# Patient Record
Sex: MALE | Race: WHITE | NOT HISPANIC OR LATINO | URBAN - METROPOLITAN AREA
[De-identification: names, ages, dates, MRNs, and addresses within clinical notes are randomized per-mention and may not be internally consistent; named-entity substitution may affect disease eponyms.]

---

## 2018-04-07 ENCOUNTER — INPATIENT (INPATIENT)
Facility: HOSPITAL | Age: 68
LOS: 7 days | Discharge: HOME | DRG: 809 | End: 2018-04-15
Attending: INTERNAL MEDICINE | Admitting: INTERNAL MEDICINE
Payer: MEDICARE

## 2018-04-07 VITALS
HEART RATE: 104 BPM | RESPIRATION RATE: 18 BRPM | WEIGHT: 194.01 LBS | TEMPERATURE: 99 F | SYSTOLIC BLOOD PRESSURE: 106 MMHG | HEIGHT: 71 IN | DIASTOLIC BLOOD PRESSURE: 69 MMHG | OXYGEN SATURATION: 98 %

## 2018-04-07 DIAGNOSIS — C91.40 HAIRY CELL LEUKEMIA NOT HAVING ACHIEVED REMISSION: ICD-10-CM

## 2018-04-07 DIAGNOSIS — E53.8 DEFICIENCY OF OTHER SPECIFIED B GROUP VITAMINS: ICD-10-CM

## 2018-04-07 DIAGNOSIS — D70.9 NEUTROPENIA, UNSPECIFIED: ICD-10-CM

## 2018-04-07 DIAGNOSIS — R74.0 NONSPECIFIC ELEVATION OF LEVELS OF TRANSAMINASE AND LACTIC ACID DEHYDROGENASE [LDH]: ICD-10-CM

## 2018-04-07 DIAGNOSIS — D61.818 OTHER PANCYTOPENIA: ICD-10-CM

## 2018-04-07 LAB
ALBUMIN SERPL ELPH-MCNC: 3.9 G/DL — SIGNIFICANT CHANGE UP (ref 3.3–5)
ALP SERPL-CCNC: 82 U/L — SIGNIFICANT CHANGE UP (ref 40–120)
ALT FLD-CCNC: 53 U/L RC — HIGH (ref 10–45)
ANION GAP SERPL CALC-SCNC: 13 MMOL/L — SIGNIFICANT CHANGE UP (ref 5–17)
APPEARANCE UR: CLEAR — SIGNIFICANT CHANGE UP
APTT BLD: 29.9 SEC — SIGNIFICANT CHANGE UP (ref 27.5–37.4)
AST SERPL-CCNC: 42 U/L — HIGH (ref 10–40)
BACTERIA # UR AUTO: ABNORMAL /HPF
BASE EXCESS BLDV CALC-SCNC: 2.1 MMOL/L — HIGH (ref -2–2)
BILIRUB SERPL-MCNC: 0.8 MG/DL — SIGNIFICANT CHANGE UP (ref 0.2–1.2)
BILIRUB UR-MCNC: NEGATIVE — SIGNIFICANT CHANGE UP
BUN SERPL-MCNC: 14 MG/DL — SIGNIFICANT CHANGE UP (ref 7–23)
CA-I SERPL-SCNC: 1.09 MMOL/L — LOW (ref 1.12–1.3)
CALCIUM SERPL-MCNC: 8.9 MG/DL — SIGNIFICANT CHANGE UP (ref 8.4–10.5)
CHLORIDE BLDV-SCNC: 101 MMOL/L — SIGNIFICANT CHANGE UP (ref 96–108)
CHLORIDE SERPL-SCNC: 95 MMOL/L — LOW (ref 96–108)
CO2 BLDV-SCNC: 28 MMOL/L — SIGNIFICANT CHANGE UP (ref 22–30)
CO2 SERPL-SCNC: 25 MMOL/L — SIGNIFICANT CHANGE UP (ref 22–31)
COLOR SPEC: YELLOW — SIGNIFICANT CHANGE UP
CREAT SERPL-MCNC: 0.97 MG/DL — SIGNIFICANT CHANGE UP (ref 0.5–1.3)
DIFF PNL FLD: NEGATIVE — SIGNIFICANT CHANGE UP
GAS PNL BLDV: 130 MMOL/L — LOW (ref 136–145)
GAS PNL BLDV: SIGNIFICANT CHANGE UP
GAS PNL BLDV: SIGNIFICANT CHANGE UP
GLUCOSE BLDV-MCNC: 115 MG/DL — HIGH (ref 70–99)
GLUCOSE SERPL-MCNC: 120 MG/DL — HIGH (ref 70–99)
GLUCOSE UR QL: NEGATIVE — SIGNIFICANT CHANGE UP
HCO3 BLDV-SCNC: 27 MMOL/L — SIGNIFICANT CHANGE UP (ref 21–29)
HCT VFR BLD CALC: 27.3 % — LOW (ref 39–50)
HCT VFR BLD CALC: 29.7 % — LOW (ref 39–50)
HCT VFR BLDA CALC: 30 % — LOW (ref 39–50)
HGB BLD CALC-MCNC: 9.6 G/DL — LOW (ref 13–17)
HGB BLD-MCNC: 10.5 G/DL — LOW (ref 13–17)
HGB BLD-MCNC: 9.6 G/DL — LOW (ref 13–17)
INR BLD: 1.34 RATIO — HIGH (ref 0.88–1.16)
KETONES UR-MCNC: NEGATIVE — SIGNIFICANT CHANGE UP
LACTATE BLDV-MCNC: 1.3 MMOL/L — SIGNIFICANT CHANGE UP (ref 0.7–2)
LEUKOCYTE ESTERASE UR-ACNC: NEGATIVE — SIGNIFICANT CHANGE UP
MCHC RBC-ENTMCNC: 30.4 PG — SIGNIFICANT CHANGE UP (ref 27–34)
MCHC RBC-ENTMCNC: 30.9 PG — SIGNIFICANT CHANGE UP (ref 27–34)
MCHC RBC-ENTMCNC: 35.1 GM/DL — SIGNIFICANT CHANGE UP (ref 32–36)
MCHC RBC-ENTMCNC: 35.3 GM/DL — SIGNIFICANT CHANGE UP (ref 32–36)
MCV RBC AUTO: 86.6 FL — SIGNIFICANT CHANGE UP (ref 80–100)
MCV RBC AUTO: 87.5 FL — SIGNIFICANT CHANGE UP (ref 80–100)
NITRITE UR-MCNC: NEGATIVE — SIGNIFICANT CHANGE UP
PCO2 BLDV: 46 MMHG — SIGNIFICANT CHANGE UP (ref 35–50)
PH BLDV: 7.39 — SIGNIFICANT CHANGE UP (ref 7.35–7.45)
PH UR: 6 — SIGNIFICANT CHANGE UP (ref 5–8)
PLATELET # BLD AUTO: 147 K/UL — LOW (ref 150–400)
PLATELET # BLD AUTO: 162 K/UL — SIGNIFICANT CHANGE UP (ref 150–400)
PO2 BLDV: 20 MMHG — LOW (ref 25–45)
POTASSIUM BLDV-SCNC: 3.8 MMOL/L — SIGNIFICANT CHANGE UP (ref 3.5–5)
POTASSIUM SERPL-MCNC: 4.1 MMOL/L — SIGNIFICANT CHANGE UP (ref 3.5–5.3)
POTASSIUM SERPL-SCNC: 4.1 MMOL/L — SIGNIFICANT CHANGE UP (ref 3.5–5.3)
PROT SERPL-MCNC: 7.9 G/DL — SIGNIFICANT CHANGE UP (ref 6–8.3)
PROT UR-MCNC: 30 MG/DL
PROTHROM AB SERPL-ACNC: 14.6 SEC — HIGH (ref 9.8–12.7)
RBC # BLD: 3.15 M/UL — LOW (ref 4.2–5.8)
RBC # BLD: 3.39 M/UL — LOW (ref 4.2–5.8)
RBC # FLD: 16.6 % — HIGH (ref 10.3–14.5)
RBC # FLD: 16.8 % — HIGH (ref 10.3–14.5)
RBC CASTS # UR COMP ASSIST: ABNORMAL /HPF (ref 0–2)
SAO2 % BLDV: 25 % — LOW (ref 67–88)
SODIUM SERPL-SCNC: 133 MMOL/L — LOW (ref 135–145)
SP GR SPEC: 1.02 — SIGNIFICANT CHANGE UP (ref 1.01–1.02)
UROBILINOGEN FLD QL: 1 MG/DL
WBC # BLD: 0.4 K/UL — CRITICAL LOW (ref 3.8–10.5)
WBC # BLD: 0.4 K/UL — CRITICAL LOW (ref 3.8–10.5)
WBC # FLD AUTO: 0.4 K/UL — CRITICAL LOW (ref 3.8–10.5)
WBC # FLD AUTO: 0.4 K/UL — CRITICAL LOW (ref 3.8–10.5)
WBC UR QL: SIGNIFICANT CHANGE UP /HPF (ref 0–5)

## 2018-04-07 PROCEDURE — 99223 1ST HOSP IP/OBS HIGH 75: CPT

## 2018-04-07 PROCEDURE — 93010 ELECTROCARDIOGRAM REPORT: CPT

## 2018-04-07 PROCEDURE — 71045 X-RAY EXAM CHEST 1 VIEW: CPT | Mod: 26

## 2018-04-07 PROCEDURE — 99285 EMERGENCY DEPT VISIT HI MDM: CPT | Mod: 25

## 2018-04-07 RX ORDER — CEFEPIME 1 G/1
2000 INJECTION, POWDER, FOR SOLUTION INTRAMUSCULAR; INTRAVENOUS ONCE
Qty: 0 | Refills: 0 | Status: DISCONTINUED | OUTPATIENT
Start: 2018-04-07 | End: 2018-04-07

## 2018-04-07 RX ORDER — PREGABALIN 225 MG/1
1 CAPSULE ORAL
Qty: 0 | Refills: 0 | COMMUNITY

## 2018-04-07 RX ORDER — DOCUSATE SODIUM 100 MG
100 CAPSULE ORAL THREE TIMES A DAY
Qty: 0 | Refills: 0 | Status: DISCONTINUED | OUTPATIENT
Start: 2018-04-07 | End: 2018-04-15

## 2018-04-07 RX ORDER — ACETAMINOPHEN 500 MG
1000 TABLET ORAL ONCE
Qty: 0 | Refills: 0 | Status: COMPLETED | OUTPATIENT
Start: 2018-04-07 | End: 2018-04-07

## 2018-04-07 RX ORDER — CEFEPIME 1 G/1
2000 INJECTION, POWDER, FOR SOLUTION INTRAMUSCULAR; INTRAVENOUS ONCE
Qty: 0 | Refills: 0 | Status: COMPLETED | OUTPATIENT
Start: 2018-04-07 | End: 2018-04-07

## 2018-04-07 RX ORDER — CEFEPIME 1 G/1
2000 INJECTION, POWDER, FOR SOLUTION INTRAMUSCULAR; INTRAVENOUS EVERY 8 HOURS
Qty: 0 | Refills: 0 | Status: DISCONTINUED | OUTPATIENT
Start: 2018-04-08 | End: 2018-04-13

## 2018-04-07 RX ORDER — ACETAMINOPHEN 500 MG
650 TABLET ORAL EVERY 6 HOURS
Qty: 0 | Refills: 0 | Status: DISCONTINUED | OUTPATIENT
Start: 2018-04-07 | End: 2018-04-15

## 2018-04-07 RX ORDER — SODIUM CHLORIDE 9 MG/ML
3 INJECTION INTRAMUSCULAR; INTRAVENOUS; SUBCUTANEOUS ONCE
Qty: 0 | Refills: 0 | Status: COMPLETED | OUTPATIENT
Start: 2018-04-07 | End: 2018-04-07

## 2018-04-07 RX ORDER — ATORVASTATIN CALCIUM 80 MG/1
1 TABLET, FILM COATED ORAL
Qty: 0 | Refills: 0 | COMMUNITY

## 2018-04-07 RX ORDER — EZETIMIBE 10 MG/1
1 TABLET ORAL
Qty: 0 | Refills: 0 | COMMUNITY

## 2018-04-07 RX ORDER — ATORVASTATIN CALCIUM 80 MG/1
20 TABLET, FILM COATED ORAL
Qty: 0 | Refills: 0 | Status: DISCONTINUED | OUTPATIENT
Start: 2018-04-07 | End: 2018-04-15

## 2018-04-07 RX ORDER — SENNA PLUS 8.6 MG/1
2 TABLET ORAL AT BEDTIME
Qty: 0 | Refills: 0 | Status: DISCONTINUED | OUTPATIENT
Start: 2018-04-07 | End: 2018-04-15

## 2018-04-07 RX ORDER — ASPIRIN/CALCIUM CARB/MAGNESIUM 324 MG
81 TABLET ORAL DAILY
Qty: 0 | Refills: 0 | Status: DISCONTINUED | OUTPATIENT
Start: 2018-04-07 | End: 2018-04-15

## 2018-04-07 RX ORDER — ASPIRIN/CALCIUM CARB/MAGNESIUM 324 MG
1 TABLET ORAL
Qty: 0 | Refills: 0 | COMMUNITY

## 2018-04-07 RX ORDER — HEPARIN SODIUM 5000 [USP'U]/ML
5000 INJECTION INTRAVENOUS; SUBCUTANEOUS EVERY 8 HOURS
Qty: 0 | Refills: 0 | Status: DISCONTINUED | OUTPATIENT
Start: 2018-04-07 | End: 2018-04-07

## 2018-04-07 RX ORDER — SODIUM CHLORIDE 9 MG/ML
500 INJECTION INTRAMUSCULAR; INTRAVENOUS; SUBCUTANEOUS
Qty: 0 | Refills: 0 | Status: COMPLETED | OUTPATIENT
Start: 2018-04-07 | End: 2018-04-07

## 2018-04-07 RX ORDER — ACETAMINOPHEN 500 MG
650 TABLET ORAL EVERY 6 HOURS
Qty: 0 | Refills: 0 | Status: DISCONTINUED | OUTPATIENT
Start: 2018-04-07 | End: 2018-04-07

## 2018-04-07 RX ADMIN — SODIUM CHLORIDE 2000 MILLILITER(S): 9 INJECTION INTRAMUSCULAR; INTRAVENOUS; SUBCUTANEOUS at 19:34

## 2018-04-07 RX ADMIN — Medication 1000 MILLIGRAM(S): at 19:26

## 2018-04-07 RX ADMIN — SODIUM CHLORIDE 2000 MILLILITER(S): 9 INJECTION INTRAMUSCULAR; INTRAVENOUS; SUBCUTANEOUS at 17:33

## 2018-04-07 RX ADMIN — SODIUM CHLORIDE 2000 MILLILITER(S): 9 INJECTION INTRAMUSCULAR; INTRAVENOUS; SUBCUTANEOUS at 16:30

## 2018-04-07 RX ADMIN — SODIUM CHLORIDE 3 MILLILITER(S): 9 INJECTION INTRAMUSCULAR; INTRAVENOUS; SUBCUTANEOUS at 16:51

## 2018-04-07 RX ADMIN — SODIUM CHLORIDE 2000 MILLILITER(S): 9 INJECTION INTRAMUSCULAR; INTRAVENOUS; SUBCUTANEOUS at 17:32

## 2018-04-07 RX ADMIN — SODIUM CHLORIDE 2000 MILLILITER(S): 9 INJECTION INTRAMUSCULAR; INTRAVENOUS; SUBCUTANEOUS at 16:51

## 2018-04-07 RX ADMIN — CEFEPIME 100 MILLIGRAM(S): 1 INJECTION, POWDER, FOR SOLUTION INTRAMUSCULAR; INTRAVENOUS at 16:51

## 2018-04-07 RX ADMIN — Medication 1000 MILLIGRAM(S): at 19:56

## 2018-04-07 RX ADMIN — Medication 650 MILLIGRAM(S): at 23:01

## 2018-04-07 RX ADMIN — Medication 100 MILLIGRAM(S): at 23:01

## 2018-04-07 NOTE — ED PROVIDER NOTE - PHYSICAL EXAMINATION
GEN: NAD, awake, eyes open spontaneously  HEENT: NCAT, MMM, Trachea midline, normal conjunctiva, perrl, normal oral pharynx   CHEST/LUNGS: Non-tachypneic, CTAB, bilateral breath sounds  CARDIAC: Non-tachycardic, normal perfusion  ABDOMEN: Soft, NTND, No rebound/guarding  MSK: No edema, no gross deformity of extremities  SKIN: No rashes, no petechiae, no vesicles  NEURO: CN grossly intact, normal coordination, no focal motor or sensory deficits  PSYCH: Alert, appropriate, cooperative, with capacity and insight

## 2018-04-07 NOTE — H&P ADULT - PROBLEM SELECTOR PLAN 1
- on history and exam, no obvious source of infection  - cont cefepime  - f/u blood and urine cultures  - if persistent fevers without improvement, consider ID consult and pan CT scan  - neutropenic precautions

## 2018-04-07 NOTE — H&P ADULT - ASSESSMENT
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

## 2018-04-07 NOTE — H&P ADULT - PROBLEM SELECTOR PLAN 5
- mild transaminitis with mild coagulopathy  - cont to monitor, if worsening, will need additional workup  - no abd pain at all

## 2018-04-07 NOTE — H&P ADULT - NSHPLABSRESULTS_GEN_ALL_CORE
Personally reviewed labs.   Personally reviewed imaging.                             9.6    0.4   )-----------( 147      ( 2018 19:55 )             27.3       04-    133<L>  |  95<L>  |  14  ----------------------------<  120<H>  4.1   |  25  |  0.97    Ca    8.9      2018 16:35    TPro  7.9  /  Alb  3.9  /  TBili  0.8  /  DBili  x   /  AST  42<H>  /  ALT  53<H>  /  AlkPhos  82  -            LIVER FUNCTIONS - ( 2018 16:35 )  Alb: 3.9 g/dL / Pro: 7.9 g/dL / ALK PHOS: 82 U/L / ALT: 53 U/L RC / AST: 42 U/L / GGT: x             PT/INR - ( 2018 16:35 )   PT: 14.6 sec;   INR: 1.34 ratio         PTT - ( 2018 16:35 )  PTT:29.9 sec    Urinalysis Basic - ( 2018 17:39 )    Color: Yellow / Appearance: Clear / S.019 / pH: x  Gluc: x / Ketone: Negative  / Bili: Negative / Urobili: 1 mg/dL   Blood: x / Protein: 30 mg/dL / Nitrite: Negative   Leuk Esterase: Negative / RBC: 2-5 /HPF / WBC 2-5 /HPF   Sq Epi: x / Non Sq Epi: x / Bacteria: Few /HPF

## 2018-04-07 NOTE — H&P ADULT - NSHPOUTPATIENTPROVIDERS_GEN_ALL_CORE
Dr. Harjinder Pittman (PMD), Dr. Haroon Amato ([facility]), Dr. Krishan Arboleda (Pul[facility])

## 2018-04-07 NOTE — H&P ADULT - PROBLEM SELECTOR PLAN 2
- multi component, likely related to leukemia, chemo, and ?poss b12 def?  - will check anemia labs  - no obvious s/s of bleeding  - will likely need neupogen/neulasta by hemeonc in AM

## 2018-04-07 NOTE — ED PROVIDER NOTE - OBJECTIVE STATEMENT
67 year old male with pmhx of pneumonia, hyperlipidemia, B-12 deficiency presents with frequent and constant fever since yesterday. Had OP chemotherapy at [facility] and oncologist informed patient about low grade fever. However patient said even with Tylenol fevers are persistent.  Last temperature taken this afternoon at 101.6. Denies abdominal pain, nausea, vomiting or diarrhea. Patient does notes slight constipation. Normal bowel movements and appetite.   PMD- Harjinder Pittman 67 year old male with pmhx of pneumonia, hyperlipidemia, B-12 deficiency presents with frequent and constant fever since yesterday. Had OP chemotherapy at [facility] and oncologist informed patient about low grade fever. However patient said even with Tylenol fevers are persistent.  Last temperature taken this afternoon at 101.6. Denies abdominal pain, nausea, vomiting or diarrhea. Patient does notes slight constipation. Normal bowel movements and appetite. No urinary symptoms.   PMD- Harjinder Pittman

## 2018-04-07 NOTE — H&P ADULT - HISTORY OF PRESENT ILLNESS
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    Pt states that at the end of his chemo, he was having right ear pain, sore throat, sore mouth, and mild headache, but these symptoms have all resolved, except for his headache. Reports only having a 4 out of 10 level mild headache currently. Also reports chronic nocturia/urinary frequency/difficulty starting stream/dysuria/incomplete emptying that he's had for many years. At this time, denies chest pain, SOB, abd pain, N/V, diarrhea, URI symptoms, rashes, joint pains, vision changes, neck stiffness. Pt started having fevers yesterday, for which he was told to come into the hospital. Pt reports receiving neulasta on 3/30. Pt showed me labs from 3/30 showing Hb of 11.3, and from 4/5 showing Hb of 10.7.    VS: .5, P 104, /69, R 18, 98% RA  In the ED, received cefepime 2g, NS 2.5L, tylenol.

## 2018-04-07 NOTE — H&P ADULT - NSHPPHYSICALEXAM_GEN_ALL_CORE
PHYSICAL EXAM:   GENERAL: Alert. Not confused. No acute distress. Not thin. Not cachectic. Not obese.  HEAD:  Atraumatic. Normocephalic.  EYES: EOMI. PERRLA. Normal conjunctiva/sclera.  ENT: Neck supple. No JVD. Moist oral mucosa. Not edentulous. No thrush.  LYMPH: Normal supraclavicular/cervical lymph nodes.   CARDIAC: Not tachy, Not jatin. Not irregularly irregular. Regular rate. S1. S2. No murmur. No rub. No distant heart sounds.  LUNG/CHEST: CTAB. BS equal bilaterally. No wheezes. No rales. No rhonchi.  ABDOMEN: Soft. No tenderness. No distension. No fluid wave. Normal bowel sounds.  BACK: No midline/vertebral tenderness. No flank tenderness.  VASCULAR: +2 b/l radial or ulnar pulses. Palpable DP pulses.  EXTREMITIES:  No clubbing. No cyanosis. No edema. Moving all 4.  NEUROLOGY: A&Ox3. Non-focal exam. Cranial nerves intact. Normal speech. Sensation intact.  PSYCH: Normal behavior. Normal affect.  SKIN: No jaundice. No erythema. No rash/lesion.  Vascular Access:     ICU Vital Signs Last 24 Hrs  T(C): 37.2 (07 Apr 2018 21:21), Max: 38.1 (07 Apr 2018 16:30)  T(F): 98.9 (07 Apr 2018 21:21), Max: 100.5 (07 Apr 2018 16:30)  HR: 108 (07 Apr 2018 21:21) (88 - 108)  BP: 136/59 (07 Apr 2018 21:21) (106/69 - 140/62)  BP(mean): --  ABP: --  ABP(mean): --  RR: 18 (07 Apr 2018 21:21) (17 - 18)  SpO2: 100% (07 Apr 2018 21:21) (98% - 100%)      I&O's Summary

## 2018-04-07 NOTE — H&P ADULT - NSHPREVIEWOFSYSTEMS_GEN_ALL_CORE
REVIEW OF SYSTEMS:  CONSTITUTIONAL: No weakness. +fevers. No chills. No rigors. No weight loss. No poor appetite.  EYES: No visual changes. No eye pain.  ENT: No hearing difficulty. No vertigo. No dysphagia. No Sinusitis/rhinorrhea.  NECK: No pain. No stiffness/rigidity.  CARDIAC: No chest pain. No palpitations.  RESPIRATORY: No cough. No SOB. No hemoptysis.  GASTROINTESTINAL: No abdominal pain. No nausea. No vomiting. No hematemesis. No diarrhea. No constipation. No melena. No hematochezia.  GENITOURINARY: +dysuria. +frequency. +hesitancy. No hematuria.  NEUROLOGICAL: No numbness/tingling. No focal weakness. No incontinence. +headache.  BACK: No back pain.  EXTREMITIES: No lower extremity edema. Full ROM.  SKIN: No rashes. No itching. No other lesions.  PSYCHIATRIC: No depression. No anxiety. No SI/HI.  ALLERGIC: No lip swelling. No hives.  All other review of systems is negative unless indicated above.  Unless indicated above, unable to assess ROS 2/2

## 2018-04-07 NOTE — ED PROVIDER NOTE - MEDICAL DECISION MAKING DETAILS
Will obtain IV x 2, cbc, cmp, +/-ce, VBG with lactate at time 0, fluid bolus @ 30cc/kg initiated upon departure from the room on initial assessment within 30 minutes of arrival, urine analysis and blood cultures x 2 are obtained prior to antibiotics =>prior to administration of antibiotics goal within 3 hours of arrival, and will repeat lactate with VBG if the original is elevated and do so within 6 hours of initial lactate. Neutropenic fever with tachycardia and signs of sepsis: Will obtain IV x 2, cbc, cmp, +/-ce, VBG with lactate at time 0, fluid bolus @ 30cc/kg initiated upon departure from the room on initial assessment within 30 minutes of arrival, urine analysis and blood cultures x 2 are obtained prior to antibiotics =>prior to administration of antibiotics goal within 3 hours of arrival, and will repeat lactate with VBG if the original is elevated and do so within 6 hours of initial lactate.

## 2018-04-07 NOTE — ED PROVIDER NOTE - PROGRESS NOTE DETAILS
Family would like patient admitted to Dr. WAN Barlow who would like Dr. Davenport to take care of patient while he is not available for coverage. Will admit at this time.

## 2018-04-07 NOTE — H&P ADULT - NSHPSOCIALHISTORY_GEN_ALL_CORE
Social History:    Marital Status: ( x ) , (  ) Single, (  ) , (  ) , (  )   # of Children: 3  Lives with: (  ) alone, (  ) children, ( x ) spouse, (  ) parents, (  ) siblings, (  ) friends, (  ) other:   Occupation: retired     Substance Use/Illicit Drugs: (  ) never used vs other:   Tobacco Usage: (  ) never smoked, ( x ) former smoker, (  ) current smoker and Total Pack-Years: 18  Last Alcohol Usage/Frequency/Amount/Withdrawal/Hx of Abuse:  months ago  Foreign travel/Animal exposure:

## 2018-04-08 DIAGNOSIS — D64.9 ANEMIA, UNSPECIFIED: ICD-10-CM

## 2018-04-08 LAB
ALBUMIN SERPL ELPH-MCNC: 3.1 G/DL — LOW (ref 3.3–5)
ALP SERPL-CCNC: 72 U/L — SIGNIFICANT CHANGE UP (ref 40–120)
ALT FLD-CCNC: 43 U/L RC — SIGNIFICANT CHANGE UP (ref 10–45)
ANION GAP SERPL CALC-SCNC: 12 MMOL/L — SIGNIFICANT CHANGE UP (ref 5–17)
AST SERPL-CCNC: 19 U/L — SIGNIFICANT CHANGE UP (ref 10–40)
BILIRUB SERPL-MCNC: 0.9 MG/DL — SIGNIFICANT CHANGE UP (ref 0.2–1.2)
BLD GP AB SCN SERPL QL: NEGATIVE — SIGNIFICANT CHANGE UP
BUN SERPL-MCNC: 12 MG/DL — SIGNIFICANT CHANGE UP (ref 7–23)
CALCIUM SERPL-MCNC: 8.3 MG/DL — LOW (ref 8.4–10.5)
CHLORIDE SERPL-SCNC: 98 MMOL/L — SIGNIFICANT CHANGE UP (ref 96–108)
CHOLEST SERPL-MCNC: 85 MG/DL — SIGNIFICANT CHANGE UP (ref 10–199)
CO2 SERPL-SCNC: 23 MMOL/L — SIGNIFICANT CHANGE UP (ref 22–31)
CREAT SERPL-MCNC: 0.84 MG/DL — SIGNIFICANT CHANGE UP (ref 0.5–1.3)
CULTURE RESULTS: NO GROWTH — SIGNIFICANT CHANGE UP
FOLATE SERPL-MCNC: 16.2 NG/ML — SIGNIFICANT CHANGE UP (ref 4.8–24.2)
GLUCOSE SERPL-MCNC: 136 MG/DL — HIGH (ref 70–99)
HBA1C BLD-MCNC: 5.5 % — SIGNIFICANT CHANGE UP (ref 4–5.6)
HCT VFR BLD CALC: 26.4 % — LOW (ref 39–50)
HDLC SERPL-MCNC: 18 MG/DL — LOW (ref 40–125)
HGB BLD-MCNC: 8.9 G/DL — LOW (ref 13–17)
IRON SATN MFR SERPL: 18 UG/DL — LOW (ref 45–165)
LDH SERPL L TO P-CCNC: 134 U/L — SIGNIFICANT CHANGE UP (ref 50–242)
LIPID PNL WITH DIRECT LDL SERPL: 55 MG/DL — SIGNIFICANT CHANGE UP
MAGNESIUM SERPL-MCNC: 1.9 MG/DL — SIGNIFICANT CHANGE UP (ref 1.6–2.6)
MCHC RBC-ENTMCNC: 29.4 PG — SIGNIFICANT CHANGE UP (ref 27–34)
MCHC RBC-ENTMCNC: 33.8 GM/DL — SIGNIFICANT CHANGE UP (ref 32–36)
MCV RBC AUTO: 87 FL — SIGNIFICANT CHANGE UP (ref 80–100)
PHOSPHATE SERPL-MCNC: 2.5 MG/DL — SIGNIFICANT CHANGE UP (ref 2.5–4.5)
PLATELET # BLD AUTO: 157 K/UL — SIGNIFICANT CHANGE UP (ref 150–400)
POTASSIUM SERPL-MCNC: 3.8 MMOL/L — SIGNIFICANT CHANGE UP (ref 3.5–5.3)
POTASSIUM SERPL-SCNC: 3.8 MMOL/L — SIGNIFICANT CHANGE UP (ref 3.5–5.3)
PROT SERPL-MCNC: 7 G/DL — SIGNIFICANT CHANGE UP (ref 6–8.3)
RAPID RVP RESULT: SIGNIFICANT CHANGE UP
RBC # BLD: 3.04 M/UL — LOW (ref 4.2–5.8)
RBC # BLD: 3.04 M/UL — LOW (ref 4.2–5.8)
RBC # FLD: 16.5 % — HIGH (ref 10.3–14.5)
RETICS #: 15.5 K/UL — LOW (ref 25–125)
RETICS/RBC NFR: 0.5 % — SIGNIFICANT CHANGE UP (ref 0.5–2.5)
RH IG SCN BLD-IMP: POSITIVE — SIGNIFICANT CHANGE UP
SODIUM SERPL-SCNC: 133 MMOL/L — LOW (ref 135–145)
SPECIMEN SOURCE: SIGNIFICANT CHANGE UP
TOTAL CHOLESTEROL/HDL RATIO MEASUREMENT: 4.7 RATIO — SIGNIFICANT CHANGE UP (ref 3.4–9.6)
TRIGL SERPL-MCNC: 62 MG/DL — SIGNIFICANT CHANGE UP (ref 10–149)
TSH SERPL-MCNC: 1.4 UIU/ML — SIGNIFICANT CHANGE UP (ref 0.27–4.2)
URATE SERPL-MCNC: 4.1 MG/DL — SIGNIFICANT CHANGE UP (ref 3.4–8.8)
VIT B12 SERPL-MCNC: 1983 PG/ML — HIGH (ref 232–1245)
WBC # BLD: 0.4 K/UL — CRITICAL LOW (ref 3.8–10.5)
WBC # FLD AUTO: 0.4 K/UL — CRITICAL LOW (ref 3.8–10.5)

## 2018-04-08 RX ADMIN — SENNA PLUS 2 TABLET(S): 8.6 TABLET ORAL at 21:28

## 2018-04-08 RX ADMIN — Medication 81 MILLIGRAM(S): at 11:07

## 2018-04-08 RX ADMIN — Medication 100 MILLIGRAM(S): at 21:28

## 2018-04-08 RX ADMIN — CEFEPIME 100 MILLIGRAM(S): 1 INJECTION, POWDER, FOR SOLUTION INTRAMUSCULAR; INTRAVENOUS at 21:28

## 2018-04-08 RX ADMIN — Medication 650 MILLIGRAM(S): at 19:45

## 2018-04-08 RX ADMIN — Medication 100 MILLIGRAM(S): at 13:45

## 2018-04-08 RX ADMIN — CEFEPIME 100 MILLIGRAM(S): 1 INJECTION, POWDER, FOR SOLUTION INTRAMUSCULAR; INTRAVENOUS at 05:49

## 2018-04-08 RX ADMIN — Medication 100 MILLIGRAM(S): at 05:49

## 2018-04-08 RX ADMIN — Medication 650 MILLIGRAM(S): at 05:49

## 2018-04-08 RX ADMIN — CEFEPIME 100 MILLIGRAM(S): 1 INJECTION, POWDER, FOR SOLUTION INTRAMUSCULAR; INTRAVENOUS at 13:55

## 2018-04-08 NOTE — CONSULT NOTE ADULT - PROBLEM SELECTOR RECOMMENDATION 9
---s/p Neulasta  --- started antibiotics  ----await results of blood cultures  --- 30 min discussion with pt and family re: neutropenia from chemotherapy, length of treatment and expectations for recovery

## 2018-04-08 NOTE — CONSULT NOTE ADULT - SUBJECTIVE AND OBJECTIVE BOX
Patient is a 67y old  Male who presents with a chief complaint of fevers (07 Apr 2018 21:24)      HPI:  67M c hx hairy cell leukemia, s/p Cladribine chemotherapy (last dose 3/30), Neulasta on 4/2; pneumonia (Feb '18) with outpt CT chest (3/19/18) reportedly showing resolution, who complained of 3 days of fevers up to 101.6, chills.     Pt developed right ear pain, sore throat, sore mouth, and mild headache, approximately 1 week ago. Throat culture was done which was reportedly negative. Symptoms have abated somewhat, except for his headache. Reports only having a 4 out of 10 level mild headache currently. . At this time, denies chest pain, SOB, abd pain, N/V, diarrhea, URI symptoms, rashes, joint pains, vision changes, neck stiffness.   VS: .5, P 104, /69, R 18, 98% RA    In the ED, received cefepime 2g, NS 2.5L, tylenol. (07 Apr 2018 21:24)         PAST MEDICAL & SURGICAL HISTORY:  B12 deficiency  Hairy cell leukemia not having achieved remission  No significant past surgical history      SOCIAL HISTORY: no smoking, no ETOH    FAMILY HISTORY:  No pertinent family history in first degree relatives      MEDICATIONS  (STANDING):  aspirin enteric coated 81 milliGRAM(s) Oral daily  atorvastatin 20 milliGRAM(s) Oral <User Schedule>  cefepime  IVPB 2000 milliGRAM(s) IV Intermittent every 8 hours  docusate sodium 100 milliGRAM(s) Oral three times a day    MEDICATIONS  (PRN):  acetaminophen   Tablet 650 milliGRAM(s) Oral every 6 hours PRN For Temp greater than 38 C (100.4 F) or MILD pain  senna 2 Tablet(s) Oral at bedtime PRN Constipation      Allergies    No Known Allergies    Intolerances        Vital Signs Last 24 Hrs  T(C): 36.8 (08 Apr 2018 09:03), Max: 38.1 (07 Apr 2018 16:30)  T(F): 98.2 (08 Apr 2018 09:03), Max: 100.5 (07 Apr 2018 16:30)  HR: 83 (08 Apr 2018 09:03) (83 - 108)  BP: 119/62 (08 Apr 2018 09:03) (106/69 - 143/63)  BP(mean): --  RR: 18 (08 Apr 2018 09:03) (17 - 18)  SpO2: 99% (08 Apr 2018 09:03) (97% - 100%)    REVIEW OF SYSTEMS:    CONSTITUTIONAL: No weakness, fevers or chills  EYES/ENT: No visual changes;  No vertigo or throat pain   NECK: No pain or stiffness  RESPIRATORY: No cough, wheezing, hemoptysis; No shortness of breath  CARDIOVASCULAR: No chest pain or palpitations  GASTROINTESTINAL: No abdominal or epigastric pain. No nausea, vomiting, or hematemesis; No diarrhea or constipation. No melena or hematochezia.  GENITOURINARY: No dysuria, frequency or hematuria  NEUROLOGICAL: No numbness or weakness  SKIN: No itching, burning, rashes, or lesions     PHYSICAL EXAM  General: adult in NAD  HEENT: mildly erythematous oropharynx, no exudates, anicteric sclera, pink conjunctiva  Neck: supple  CV: normal S1/S2 with no murmur rubs or gallops  Lungs: positive air movement b/l ant lungs,clear to auscultation, no wheezes, no rales  Abdomen: soft non-tender non-distended, no hepatosplenomegaly  Ext: no clubbing cyanosis or edema  Skin: no rashes and no petechiae  Neuro: alert and oriented X 4, no focal deficits      LABS:                        8.9    0.4   )-----------( 157      ( 08 Apr 2018 07:15 )             26.4         Mean Cell Volume : 87.0 fl  Mean Cell Hemoglobin : 29.4 pg  Mean Cell Hemoglobin Concentration : 33.8 gm/dL      04-08    133<L>  |  98  |  12  ----------------------------<  136<H>  3.8   |  23  |  0.84    Ca    8.3<L>      08 Apr 2018 07:15  Phos  2.5     04-08  Mg     1.9     04-08    TPro  7.0  /  Alb  3.1<L>  /  TBili  0.9  /  DBili  x   /  AST  19  /  ALT  43  /  AlkPhos  72  04-08      PT/INR - ( 07 Apr 2018 16:35 )   PT: 14.6 sec;   INR: 1.34 ratio         PTT - ( 07 Apr 2018 16:35 )  PTT:29.9 sec      BLOOD SMEAR INTERPRETATION:       RADIOLOGY & ADDITIONAL STUDIES:

## 2018-04-08 NOTE — CONSULT NOTE ADULT - ASSESSMENT
HCL on clatribine with neutropenic fever.  S/P outpatient pneumonia in February with reported radiographic and clinical resolution.  Fever with mild URI symptoms which are improving.He reports OPD throat culture was negative.  His exam is non focal.  Suggest:  1.cefepime 2 gr q 8  2.Blood cultures are pending  3.RVP  4.monitor fever and exam, additional w/u as indicated.  5.ID issues reviewed with patient and wife at bedside

## 2018-04-08 NOTE — CONSULT NOTE ADULT - SUBJECTIVE AND OBJECTIVE BOX
HPI:   Patient is a 67y male with a past history of  HCL diagnosed in 2017,being treated with cladribine, last dosed 3/30, who was admitted with fevers to 101 x 48 hours.He has noted a sore throat, nasal congsetion and ear pain post chemo, this has nearly resolved.He noted low grade fever starting on , it has persisted and slightly escalated, he came to ER  and was strted on cefepime.He was born in [facility], came to [place] in .He has no TB history.He was treated as OPD with ? augmentin in Feb for a pneumonia, recent chest CT reportedly showed resolution.He has no recent travel, no sick contacts.He denies any cough or chest symptoms, no GI or  symptoms.He c/o mild headache with the fever.    REVIEW OF SYSTEMS:  All other review of systems negative (Comprehensive ROS)    PAST MEDICAL & SURGICAL HISTORY:  B12 deficiency  Hairy cell leukemia not having achieved remission  No significant past surgical history      Allergies    No Known Allergies    Intolerances        Antimicrobials Day #  :day 1  cefepime  IVPB 2000 milliGRAM(s) IV Intermittent every 8 hours    Other Medications:  acetaminophen   Tablet 650 milliGRAM(s) Oral every 6 hours PRN  aspirin enteric coated 81 milliGRAM(s) Oral daily  atorvastatin 20 milliGRAM(s) Oral <User Schedule>  docusate sodium 100 milliGRAM(s) Oral three times a day  senna 2 Tablet(s) Oral at bedtime PRN      FAMILY HISTORY:  No pertinent family history in first degree relatives      SOCIAL HISTORY:  Smoking: x    ETOH:x     Drug Use: x        T(F): 99.4 (18 @ 13:56), Max: 100.5 (18 @ 16:30)  HR: 96 (18 @ 13:56)  BP: 137/63 (18 @ 13:56)  RR: 18 (18 @ 13:56)  SpO2: 100% (18 @ 13:56)  Wt(kg): --    PHYSICAL EXAM:  General: alert, no acute distress  Eyes:  anicteric, no conjunctival injection, no discharge  Oropharynx: no lesions or injection 	  Neck: supple, without adenopathy  Lungs: clear to auscultation  Heart: regular rate and rhythm; no murmur, rubs or gallops  Abdomen: soft, nondistended, nontender, without mass or organomegaly  Skin: no lesions  Extremities: no clubbing, cyanosis, or edema  Neurologic: alert, oriented, moves all extremities    LAB RESULTS:                        8.9    0.4   )-----------( 157      ( 2018 07:15 )             26.4     04-08    133<L>  |  98  |  12  ----------------------------<  136<H>  3.8   |  23  |  0.84    Ca    8.3<L>      2018 07:15  Phos  2.5     04-08  Mg     1.9     04-08    TPro  7.0  /  Alb  3.1<L>  /  TBili  0.9  /  DBili  x   /  AST  19  /  ALT  43  /  AlkPhos  72  04-08    LIVER FUNCTIONS - ( 2018 07:15 )  Alb: 3.1 g/dL / Pro: 7.0 g/dL / ALK PHOS: 72 U/L / ALT: 43 U/L RC / AST: 19 U/L / GGT: x           Urinalysis Basic - ( 2018 17:39 )    Color: Yellow / Appearance: Clear / S.019 / pH: x  Gluc: x / Ketone: Negative  / Bili: Negative / Urobili: 1 mg/dL   Blood: x / Protein: 30 mg/dL / Nitrite: Negative   Leuk Esterase: Negative / RBC: 2-5 /HPF / WBC 2-5 /HPF   Sq Epi: x / Non Sq Epi: x / Bacteria: Few /HPF        MICROBIOLOGY:  RECENT CULTURES:        RADIOLOGY REVIEWED:  CXR  negative

## 2018-04-09 LAB
ANION GAP SERPL CALC-SCNC: 9 MMOL/L — SIGNIFICANT CHANGE UP (ref 5–17)
APTT BLD: 32.1 SEC — SIGNIFICANT CHANGE UP (ref 27.5–37.4)
BUN SERPL-MCNC: 12 MG/DL — SIGNIFICANT CHANGE UP (ref 7–23)
CALCIUM SERPL-MCNC: 8.8 MG/DL — SIGNIFICANT CHANGE UP (ref 8.4–10.5)
CHLORIDE SERPL-SCNC: 99 MMOL/L — SIGNIFICANT CHANGE UP (ref 96–108)
CO2 SERPL-SCNC: 27 MMOL/L — SIGNIFICANT CHANGE UP (ref 22–31)
CREAT SERPL-MCNC: 0.86 MG/DL — SIGNIFICANT CHANGE UP (ref 0.5–1.3)
GLUCOSE SERPL-MCNC: 131 MG/DL — HIGH (ref 70–99)
HCT VFR BLD CALC: 25.4 % — LOW (ref 39–50)
HGB BLD-MCNC: 8.7 G/DL — LOW (ref 13–17)
INR BLD: 1.26 RATIO — HIGH (ref 0.88–1.16)
MAGNESIUM SERPL-MCNC: 2.2 MG/DL — SIGNIFICANT CHANGE UP (ref 1.6–2.6)
MCHC RBC-ENTMCNC: 28.4 PG — SIGNIFICANT CHANGE UP (ref 27–34)
MCHC RBC-ENTMCNC: 34.3 GM/DL — SIGNIFICANT CHANGE UP (ref 32–36)
MCV RBC AUTO: 83 FL — SIGNIFICANT CHANGE UP (ref 80–100)
PLATELET # BLD AUTO: 126 K/UL — LOW (ref 150–400)
POTASSIUM SERPL-MCNC: 4.2 MMOL/L — SIGNIFICANT CHANGE UP (ref 3.5–5.3)
POTASSIUM SERPL-SCNC: 4.2 MMOL/L — SIGNIFICANT CHANGE UP (ref 3.5–5.3)
PROTHROM AB SERPL-ACNC: 14.3 SEC — HIGH (ref 10–13.1)
RBC # BLD: 3.06 M/UL — LOW (ref 4.2–5.8)
RBC # FLD: 17.4 % — HIGH (ref 10.3–14.5)
SODIUM SERPL-SCNC: 135 MMOL/L — SIGNIFICANT CHANGE UP (ref 135–145)
WBC # BLD: 0.44 K/UL — CRITICAL LOW (ref 3.8–10.5)
WBC # FLD AUTO: 0.44 K/UL — CRITICAL LOW (ref 3.8–10.5)

## 2018-04-09 RX ADMIN — CEFEPIME 100 MILLIGRAM(S): 1 INJECTION, POWDER, FOR SOLUTION INTRAMUSCULAR; INTRAVENOUS at 05:05

## 2018-04-09 RX ADMIN — Medication 100 MILLIGRAM(S): at 13:58

## 2018-04-09 RX ADMIN — ATORVASTATIN CALCIUM 20 MILLIGRAM(S): 80 TABLET, FILM COATED ORAL at 20:16

## 2018-04-09 RX ADMIN — Medication 100 MILLIGRAM(S): at 05:05

## 2018-04-09 RX ADMIN — Medication 100 MILLIGRAM(S): at 21:48

## 2018-04-09 RX ADMIN — Medication 81 MILLIGRAM(S): at 11:11

## 2018-04-09 RX ADMIN — CEFEPIME 100 MILLIGRAM(S): 1 INJECTION, POWDER, FOR SOLUTION INTRAMUSCULAR; INTRAVENOUS at 21:45

## 2018-04-09 RX ADMIN — CEFEPIME 100 MILLIGRAM(S): 1 INJECTION, POWDER, FOR SOLUTION INTRAMUSCULAR; INTRAVENOUS at 14:40

## 2018-04-09 NOTE — CONSULT NOTE ADULT - ASSESSMENT
ASSESSMENT    immunocompromised host due to underlying hairy cell leukemia and following chemotherapy with neutropenia admitted with neutropenic fever without a clear source of infection - the patient's examination is non-focal and all cultures remain negative - the patient was treated with oral antibiotics in late January - early February for a left lower lobe and lingular pneumonia with CT follow-up showing radiographic improvement    PLAN/RECOMMENDATIONS    stable oxygenation on room air  continue cefepime until neutropenia resolves  no pulmonary medications indicated  bowel regimen  cardiac meds: ASA/lipitor  continue to follow WBC count - awaiting initiation of neupogen  consider treatment for BPH    Thank you for the courtesy of this referral. Plan of care discussed with the patient and his wife at bedside.    Harjinder Garcia MD, [facility] - [phone]  Pulmonary Medicine ASSESSMENT    immunocompromised host due to underlying hairy cell leukemia and following chemotherapy with neutropenia admitted with neutropenic fever without a clear source of infection - the patient's examination is non-focal and all cultures remain negative - the patient was treated with oral antibiotics in late January - early February for a left lower lobe and lingular pneumonia with CT follow-up showing radiographic improvement - urinary retention due to BPH certainly places the patient at risk for UTIs "down the road"    PLAN/RECOMMENDATIONS    stable oxygenation on room air  continue cefepime until neutropenia resolves  no pulmonary medications indicated  bowel regimen  cardiac meds: ASA/lipitor  continue to follow WBC count - awaiting initiation of neupogen  consider treatment for BPH/urology evaluation    Thank you for the courtesy of this referral. Plan of care discussed with the patient and his wife at bedside and Dr. Ethel Garcia MD, [facility] - [phone]  Pulmonary Medicine

## 2018-04-09 NOTE — CONSULT NOTE ADULT - SUBJECTIVE AND OBJECTIVE BOX
[facility] - [facility]  CONSULT NOTE    HPI: 67 year old gentleman, former smoker, diagnosed with hairy cell leukemia in 2017 being treated with cladribine (last dose 3/30) and Neulasta (last dose on ) and treated for pneumonia in January as an outpatient with 14 days of antibiotics. A CT scan revealed radiographic resolution prior to resuming chemotherapy. The patient was admitted  with 48 hours of fevers up to 101F in the setting of neutropenia and associated with chills.. Sore throat, nasal congestion, headache and right ear pain following his last dose of chemotherapy have resolved. Throat culture was negative. The patient has been treated empirically with cefepime without a clear source of infection. All cultures remain negative    PMHX:  B12 deficiency  Hairy cell leukemia not having achieved remission  Hyperlipidemia  Hypertension  Pernicious anemia  Shoulder pain (left)      PSHX:  Colonoscopy/EGD      FAMILY HISTORY:  mother - stroke  father - stroke  brother - lung cancer    SOCIAL HISTORY:  former smoker - 1ppd x 19 years discontinue in ; ; born in [facility]; moved to [place] in ; no known history of tuberculosis    Pulmonary Medications:       Antimicrobials:  cefepime  IVPB 2000 milliGRAM(s) IV Intermittent every 8 hours      Cardiology:      Other:  acetaminophen   Tablet 650 milliGRAM(s) Oral every 6 hours PRN  aspirin enteric coated 81 milliGRAM(s) Oral daily  atorvastatin 20 milliGRAM(s) Oral <User Schedule>  docusate sodium 100 milliGRAM(s) Oral three times a day  senna 2 Tablet(s) Oral at bedtime PRN      Allergies    No Known Allergies    Intolerances        HOME MEDICATIONS: see  H & P    REVIEW OF SYSTEMS:  Constitutional: As per HPI  HEENT: Within normal limits  CV: As per HPI  Resp: As per HPI  GI: Within normal limits   : Within normal limits  Musculoskeletal: Within normal limits  Skin: Within normal limits  Neurological: Within normal limits  Psychiatric: Within normal limits  Endocrine: Within normal limits  Hematologic/Lymphatic: Within normal limits  Allergic/Immunologic: Within normal limits    [x] All other systems negative    OBJECTIVE:      I&O's Detail    2018 07:01  -  2018 07:00  --------------------------------------------------------  IN:    IV PiggyBack: 100 mL  Total IN: 100 mL    OUT:  Total OUT: 0 mL    Total NET: 100 mL      2018 07:01  -  2018 14:44  --------------------------------------------------------  IN:    IV PiggyBack: 50 mL    Oral Fluid: 360 mL  Total IN: 410 mL    OUT:    Voided: 100 mL  Total OUT: 100 mL    Total NET: 310 mL    PHYSICAL EXAM:  ICU Vital Signs Last 24 Hrs  T(C): 37.4 (2018 12:16), Max: 38.1 (2018 19:40)  T(F): 99.3 (2018 12:16), Max: 100.5 (2018 19:40)  HR: 77 (2018 12:16) (77 - 113)  BP: 115/57 (2018 12:16) (115/57 - 143/68)  BP(mean): --  ABP: --  ABP(mean): --  RR: 18 (2018 12:16) (18 - 18)  SpO2: 97% (2018 12:16) (97% - 100%)    General: Awake. Alert. Cooperative. No distress. Appears stated age 	  HEENT:   Atraumatic. Normocephalic. Anicteric. Normal oral mucosa, PERRL, EOMI  Neck: Supple. Trachea midline. Thyroid without enlargement/tenderness/nodules. No carotid bruit. No JVD	  Cardiovascular: Regular rate and rhythm. S1 S2 normal. No murmurs, rubs or gallops  Respiratory: Respirations unlabored. Clear to auscultation and percussion bilaterally  Abdomen: Soft. Non-tender. Non-distended. No organomegaly. No masses. Normal bowel sounds	  Extremities: Warm to touch. No clubbing or cyanosis. No pedal edema.  Pulses: 2+ peripheral pulses all extremities	  Skin: Normal skin color. No rashes or lesions. No ecchymoses, No cyanosis. Warm to touch  Lymph Nodes: Cervical, supraclavicular and axillary nodes normal  Neurological: Motor and sensory examination equal and normal. A and O x 3  Psychiatry: Appropriate mood and affect.      LABS:                          8.7    0.44  )-----------( 126      ( 2018 09:26 )             25.4                         8.9    0.4   )-----------( 157      ( 2018 07:15 )             26.4     04-09    135  |  99  |  12  ----------------------------<  131<H>  4.2   |  27  |  0.86    04-08    133<L>  |  98  |  12  ----------------------------<  136<H>  3.8   |  23  |  0.84    Ca      8.8      -    Ca      8.3<L>      08    Phos    2.5     -08      Mg       2.2     04-09    Mg       1.9     -08    TPro  7.0  /  Alb  3.1<L>  /  TBili  0.9  /  DBili  x   /  AST  19  /  ALT  43  /  AlkPhos  72  -08    TPro  7.9  /  Alb  3.9  /  TBili  0.8  /  DBili  x   /  AST  42<H>  /  ALT  53<H>  /  AlkPhos  82  04-07    PT/INR - ( 2018 09:26 )   PT: 14.3 sec;   INR: 1.26 ratio       PTT - ( 2018 09:26 )  PTT:32.1 sec    Venous Blood Gas:   @ 17:18  7.39/46/20/27/25  VBG Lactate: 1.3    MICROBIOLOGY:   Urinalysis Basic - ( 2018 17:39 )    Color: Yellow / Appearance: Clear / S.019 / pH: x  Gluc: x / Ketone: Negative  / Bili: Negative / Urobili: 1 mg/dL   Blood: x / Protein: 30 mg/dL / Nitrite: Negative   Leuk Esterase: Negative / RBC: 2-5 /HPF / WBC 2-5 /HPF   Sq Epi: x / Non Sq Epi: x / Bacteria: Few /HPF    Culture - Urine (18 @ 20:48)    Specimen Source: .Urine Clean Catch (Midstream)    Culture Results:   No growth    Culture - Blood (18 @ 21:44)    Specimen Source: .Blood Blood-Peripheral    Culture Results:   No growth to date.    Culture - Blood (18 @ 21:44)    Specimen Source: .Blood Blood-Peripheral    Culture Results:   No growth to date.    Rapid Respiratory Viral Panel (18 @ 16:32)    Rapid RVP Result: NotDete: The FilmArray RVP Rapid uses polymerase chain reaction (PCR) and melt  curve analysis to screen for adenovirus; coronavirus HKU1, NL63, 229E,  OC43; human metapneumovirus (hMPV); human enterovirus/rhinovirus  (Entero/RV); influenza A; influenza A/H1;influenza A/H3; influenza  A/H1-2009; influenza B; parainfluenza viruses 1, 2, 3, 4; respiratory  syncytial virus; Bordetella pertussis; Mycoplasma pneumoniae; and  Chlamydophila pneumoniae.      RADIOLOGY:  [x ] Chest radiographs reviewed and interpreted by me    < from: Xray Chest 1 View AP/PA (18 @ 16:08) >    EXAM:  XR CHEST AP OR PA 1V                            PROCEDURE DATE:  2018        INTERPRETATION:  CLINICAL INDICATION: Fever    TECHNIQUE: Frontal view of the chest    COMPARISON: None    FINDINGS:     The lungs are clear.  There is nopleural effusion or pneumothorax.  The heart size is normal.  There is no acute osseous abnormality.    IMPRESSION:    No acute disease.    KERRY HERNANDEZ M.D., RADIOLOGY RESIDENT  This document has been electronically signed.  MARY JORGE M.D., ATTENDING RADIOLOGIST  This document has been electronically signed. 2018  8:30AM      < end of copied text >  --------------------------------------------------------------------------------------------------------- [facility] - [facility]  CONSULT NOTE    HPI: 67 year old gentleman, former smoker, diagnosed with hairy cell leukemia in 2017. He was initially followed conservatively and initiation of chemotherapy was delayed due to a pneumonia in late January. He was treated with 14 days of antibiotics. A CT scan revealed radiographic improvement. He received his first cycle of cladribine from 3/26 until 3/30 and received Neulasta on 3/31. Several days following chemotherapy the patient developed a sore throat, nasal congestion, headache and right ear pain which resolved without specific therapy. A throat culture was negative. The patient was admitted  with 48 hours of fevers up to 101F in the setting of neutropenia and associated with chills. The patient has been treated empirically with cefepime without a clear source of infection. All cultures remain negative. He has no cough, sputum production, chest congestion or wheeze. No chest pain/pressure or palpitations. He has had no recent fevers, chills or sweats. He has chronic urinary hesitancy and urgency due to BPH, worse of late. He has chronic constipation.     PMHX:  B12 deficiency  Hairy cell leukemia not having achieved remission  Hyperlipidemia  Hypertension  Pernicious anemia  Shoulder pain (left)      PSHX:  Colonoscopy/EGD    FAMILY HISTORY:  mother - stroke  father - stroke  brother - lung cancer    SOCIAL HISTORY:  former smoker - 1ppd x 19 years discontinue in ; ; born in [facility]; moved to [place] in ; no known history of tuberculosis    Pulmonary Medications:       Antimicrobials:  cefepime  IVPB 2000 milliGRAM(s) IV Intermittent every 8 hours      Cardiology:      Other:  acetaminophen   Tablet 650 milliGRAM(s) Oral every 6 hours PRN  aspirin enteric coated 81 milliGRAM(s) Oral daily  atorvastatin 20 milliGRAM(s) Oral <User Schedule>  docusate sodium 100 milliGRAM(s) Oral three times a day  senna 2 Tablet(s) Oral at bedtime PRN      Allergies    No Known Allergies    HOME MEDICATIONS: see  H & P    REVIEW OF SYSTEMS:  Constitutional: As per HPI  HEENT: Within normal limits  CV: As per HPI  Resp: As per HPI  GI: As per HPI  : As per HPI  Musculoskeletal: Within normal limits  Skin: Within normal limits  Neurological: Within normal limits  Psychiatric: Within normal limits  Endocrine: Within normal limits  Hematologic/Lymphatic: hairy cell leukemia  Allergic/Immunologic: Within normal limits    [x] All other systems negative    OBJECTIVE:      I&O's Detail    2018 07:  -  2018 07:00  --------------------------------------------------------  IN:    IV PiggyBack: 100 mL  Total IN: 100 mL    OUT:  Total OUT: 0 mL    Total NET: 100 mL      2018 07:  -  2018 14:44  --------------------------------------------------------  IN:    IV PiggyBack: 50 mL    Oral Fluid: 360 mL  Total IN: 410 mL    OUT:    Voided: 100 mL  Total OUT: 100 mL    Total NET: 310 mL    PHYSICAL EXAM:  ICU Vital Signs Last 24 Hrs  T(C): 37.4 (2018 12:16), Max: 38.1 (2018 19:40)  T(F): 99.3 (2018 12:16), Max: 100.5 (2018 19:40)  HR: 77 (2018 12:16) (77 - 113)  BP: 115/57 (2018 12:16) (115/57 - 143/68)  BP(mean): --  ABP: --  ABP(mean): --  RR: 18 (2018 12:16) (18 - 18)  SpO2: 97% (2018 12:16) (97% - 100%) on room air    General: Awake. Alert. Cooperative. No distress. Non toxic appearingAppears stated age 	  HEENT:   Atraumatic. Normocephalic. Anicteric. Normal oral mucosa, PERRL, EOMI. No oral lesions  Neck: Supple. Trachea midline. Thyroid without enlargement/tenderness/nodules. No carotid bruit. No JVD	  Cardiovascular: Regular rate and rhythm. S1 S2 normal. No murmurs, rubs or gallops  Respiratory: Respirations unlabored. Clear to auscultation and percussion bilaterally  Abdomen: Soft. Non-tender. Non-distended. No organomegaly. No masses. Normal bowel sounds	  Extremities: Warm to touch. No clubbing or cyanosis. No pedal edema.  Pulses: 2+ peripheral pulses all extremities	  Skin: Normal skin color. No rashes or lesions. No ecchymoses, No cyanosis. Warm to touch  Lymph Nodes: Cervical, supraclavicular and axillary nodes normal  Neurological: Motor and sensory examination equal and normal. A and O x 3  Psychiatry: Appropriate mood and affect.      LABS:                          8.7    0.44  )-----------( 126      ( 2018 09:26 )             25.4                         8.9    0.4   )-----------( 157      ( 2018 07:15 )             26.4     04-09    135  |  99  |  12  ----------------------------<  131<H>  4.2   |  27  |  0.86    04-08    133<L>  |  98  |  12  ----------------------------<  136<H>  3.8   |  23  |  0.84    Ca      8.8      04-09    Ca      8.3<L>      04-08    Phos    2.5     04-08      Mg       2.2     04-09    Mg       1.9     04-08    TPro  7.0  /  Alb  3.1<L>  /  TBili  0.9  /  DBili  x   /  AST  19  /  ALT  43  /  AlkPhos  72  04-08    TPro  7.9  /  Alb  3.9  /  TBili  0.8  /  DBili  x   /  AST  42<H>  /  ALT  53<H>  /  AlkPhos  82  04-07    PT/INR - ( 2018 09:26 )   PT: 14.3 sec;   INR: 1.26 ratio       PTT - ( 2018 09:26 )  PTT:32.1 sec    Venous Blood Gas:   @ 17:18  7.39/46/20/27/25  VBG Lactate: 1.3    MICROBIOLOGY:   Urinalysis Basic - ( 2018 17:39 )    Color: Yellow / Appearance: Clear / S.019 / pH: x  Gluc: x / Ketone: Negative  / Bili: Negative / Urobili: 1 mg/dL   Blood: x / Protein: 30 mg/dL / Nitrite: Negative   Leuk Esterase: Negative / RBC: 2-5 /HPF / WBC 2-5 /HPF   Sq Epi: x / Non Sq Epi: x / Bacteria: Few /HPF    Culture - Urine (18 @ 20:48)    Specimen Source: .Urine Clean Catch (Midstream)    Culture Results:   No growth    Culture - Blood (18 @ 21:44)    Specimen Source: .Blood Blood-Peripheral    Culture Results:   No growth to date.    Culture - Blood (18 @ 21:44)    Specimen Source: .Blood Blood-Peripheral    Culture Results:   No growth to date.    Rapid Respiratory Viral Panel (18 @ 16:32)    Rapid RVP Result: NotDete: The FilmArray RVP Rapid uses polymerase chain reaction (PCR) and melt  curve analysis to screen for adenovirus; coronavirus HKU1, NL63, 229E,  OC43; human metapneumovirus (hMPV); human enterovirus/rhinovirus  (Entero/RV); influenza A; influenza A/H1;influenza A/H3; influenza  A/H1-2009; influenza B; parainfluenza viruses 1, 2, 3, 4; respiratory  syncytial virus; Bordetella pertussis; Mycoplasma pneumoniae; and  Chlamydophila pneumoniae.      RADIOLOGY:  [x ] Chest radiographs reviewed and interpreted by me    < from: Xray Chest 1 View AP/PA (18 @ 16:08) >    EXAM:  XR CHEST AP OR PA 1V                            PROCEDURE DATE:  2018        INTERPRETATION:  CLINICAL INDICATION: Fever    TECHNIQUE: Frontal view of the chest    COMPARISON: None    FINDINGS:     The lungs are clear.  There is nopleural effusion or pneumothorax.  The heart size is normal.  There is no acute osseous abnormality.    IMPRESSION:    No acute disease.    KERRY HERNANDEZ M.D., RADIOLOGY RESIDENT  This document has been electronically signed.  MARY JORGE M.D., ATTENDING RADIOLOGIST  This document has been electronically signed. 2018  8:30AM      < end of copied text >  ---------------------------------------------------------------------------------------------------------

## 2018-04-09 NOTE — PROVIDER CONTACT NOTE (OTHER) - BACKGROUND
Admitted for neutropenia. PMHx of B12 deficiency & Hairy cell leukemia. During this admission pt has been having intermittent fevers.

## 2018-04-10 LAB
ANION GAP SERPL CALC-SCNC: 13 MMOL/L — SIGNIFICANT CHANGE UP (ref 5–17)
BASOPHILS # BLD AUTO: 0 K/UL — SIGNIFICANT CHANGE UP (ref 0–0.2)
BASOPHILS NFR BLD AUTO: 0 % — SIGNIFICANT CHANGE UP (ref 0–2)
BUN SERPL-MCNC: 12 MG/DL — SIGNIFICANT CHANGE UP (ref 7–23)
CALCIUM SERPL-MCNC: 8.4 MG/DL — SIGNIFICANT CHANGE UP (ref 8.4–10.5)
CHLORIDE SERPL-SCNC: 97 MMOL/L — SIGNIFICANT CHANGE UP (ref 96–108)
CO2 SERPL-SCNC: 25 MMOL/L — SIGNIFICANT CHANGE UP (ref 22–31)
CREAT SERPL-MCNC: 0.75 MG/DL — SIGNIFICANT CHANGE UP (ref 0.5–1.3)
EOSINOPHIL # BLD AUTO: 0.03 K/UL — SIGNIFICANT CHANGE UP (ref 0–0.5)
EOSINOPHIL NFR BLD AUTO: 4.5 % — SIGNIFICANT CHANGE UP (ref 0–6)
GLUCOSE SERPL-MCNC: 132 MG/DL — HIGH (ref 70–99)
HCT VFR BLD CALC: 23.9 % — LOW (ref 39–50)
HGB BLD-MCNC: 7.8 G/DL — LOW (ref 13–17)
IMM GRANULOCYTES NFR BLD AUTO: 0 % — SIGNIFICANT CHANGE UP (ref 0–1.5)
LYMPHOCYTES # BLD AUTO: 0.34 K/UL — LOW (ref 1–3.3)
LYMPHOCYTES # BLD AUTO: 50.7 % — HIGH (ref 13–44)
MANUAL SMEAR VERIFICATION: SIGNIFICANT CHANGE UP
MCHC RBC-ENTMCNC: 27.9 PG — SIGNIFICANT CHANGE UP (ref 27–34)
MCHC RBC-ENTMCNC: 32.6 GM/DL — SIGNIFICANT CHANGE UP (ref 32–36)
MCV RBC AUTO: 85.4 FL — SIGNIFICANT CHANGE UP (ref 80–100)
MONOCYTES # BLD AUTO: 0.05 K/UL — SIGNIFICANT CHANGE UP (ref 0–0.9)
MONOCYTES NFR BLD AUTO: 7.5 % — SIGNIFICANT CHANGE UP (ref 2–14)
NEUTROPHILS # BLD AUTO: 0.25 K/UL — LOW (ref 1.8–7.4)
NEUTROPHILS NFR BLD AUTO: 37.3 % — LOW (ref 43–77)
PLAT MORPH BLD: NORMAL — SIGNIFICANT CHANGE UP
PLATELET # BLD AUTO: 118 K/UL — LOW (ref 150–400)
POTASSIUM SERPL-MCNC: 3.5 MMOL/L — SIGNIFICANT CHANGE UP (ref 3.5–5.3)
POTASSIUM SERPL-SCNC: 3.5 MMOL/L — SIGNIFICANT CHANGE UP (ref 3.5–5.3)
RBC # BLD: 2.8 M/UL — LOW (ref 4.2–5.8)
RBC # FLD: 17.8 % — HIGH (ref 10.3–14.5)
RBC BLD AUTO: NORMAL — SIGNIFICANT CHANGE UP
SODIUM SERPL-SCNC: 135 MMOL/L — SIGNIFICANT CHANGE UP (ref 135–145)
WBC # BLD: 0.67 K/UL — CRITICAL LOW (ref 3.8–10.5)
WBC # FLD AUTO: 0.67 K/UL — CRITICAL LOW (ref 3.8–10.5)

## 2018-04-10 RX ADMIN — Medication 100 MILLIGRAM(S): at 22:43

## 2018-04-10 RX ADMIN — Medication 81 MILLIGRAM(S): at 11:19

## 2018-04-10 RX ADMIN — CEFEPIME 100 MILLIGRAM(S): 1 INJECTION, POWDER, FOR SOLUTION INTRAMUSCULAR; INTRAVENOUS at 05:15

## 2018-04-10 RX ADMIN — Medication 100 MILLIGRAM(S): at 05:12

## 2018-04-10 RX ADMIN — CEFEPIME 100 MILLIGRAM(S): 1 INJECTION, POWDER, FOR SOLUTION INTRAMUSCULAR; INTRAVENOUS at 15:39

## 2018-04-10 RX ADMIN — Medication 100 MILLIGRAM(S): at 13:32

## 2018-04-10 RX ADMIN — CEFEPIME 100 MILLIGRAM(S): 1 INJECTION, POWDER, FOR SOLUTION INTRAMUSCULAR; INTRAVENOUS at 22:43

## 2018-04-11 LAB
BASOPHILS # BLD AUTO: 0 K/UL — SIGNIFICANT CHANGE UP (ref 0–0.2)
BASOPHILS NFR BLD AUTO: 0 % — SIGNIFICANT CHANGE UP (ref 0–2)
EOSINOPHIL # BLD AUTO: 0.03 K/UL — SIGNIFICANT CHANGE UP (ref 0–0.5)
EOSINOPHIL NFR BLD AUTO: 3.6 % — SIGNIFICANT CHANGE UP (ref 0–6)
HCT VFR BLD CALC: 23.6 % — LOW (ref 39–50)
HGB BLD-MCNC: 7.9 G/DL — LOW (ref 13–17)
LYMPHOCYTES # BLD AUTO: 0.24 K/UL — LOW (ref 1–3.3)
LYMPHOCYTES # BLD AUTO: 31.8 % — SIGNIFICANT CHANGE UP (ref 13–44)
MCHC RBC-ENTMCNC: 28.3 PG — SIGNIFICANT CHANGE UP (ref 27–34)
MCHC RBC-ENTMCNC: 33.5 GM/DL — SIGNIFICANT CHANGE UP (ref 32–36)
MCV RBC AUTO: 84.6 FL — SIGNIFICANT CHANGE UP (ref 80–100)
MONOCYTES # BLD AUTO: 0.01 K/UL — SIGNIFICANT CHANGE UP (ref 0–0.9)
MONOCYTES NFR BLD AUTO: 0.9 % — LOW (ref 2–14)
NEUTROPHILS # BLD AUTO: 0.48 K/UL — LOW (ref 1.8–7.4)
NEUTROPHILS NFR BLD AUTO: 63.7 % — SIGNIFICANT CHANGE UP (ref 43–77)
PLATELET # BLD AUTO: 121 K/UL — LOW (ref 150–400)
RBC # BLD: 2.79 M/UL — LOW (ref 4.2–5.8)
RBC # FLD: 17.4 % — HIGH (ref 10.3–14.5)
WBC # BLD: 0.76 K/UL — CRITICAL LOW (ref 3.8–10.5)
WBC # FLD AUTO: 0.76 K/UL — CRITICAL LOW (ref 3.8–10.5)

## 2018-04-11 RX ADMIN — CEFEPIME 100 MILLIGRAM(S): 1 INJECTION, POWDER, FOR SOLUTION INTRAMUSCULAR; INTRAVENOUS at 21:37

## 2018-04-11 RX ADMIN — Medication 100 MILLIGRAM(S): at 21:37

## 2018-04-11 RX ADMIN — Medication 81 MILLIGRAM(S): at 11:38

## 2018-04-11 RX ADMIN — Medication 100 MILLIGRAM(S): at 05:36

## 2018-04-11 RX ADMIN — Medication 100 MILLIGRAM(S): at 13:27

## 2018-04-11 RX ADMIN — CEFEPIME 100 MILLIGRAM(S): 1 INJECTION, POWDER, FOR SOLUTION INTRAMUSCULAR; INTRAVENOUS at 13:27

## 2018-04-11 RX ADMIN — CEFEPIME 100 MILLIGRAM(S): 1 INJECTION, POWDER, FOR SOLUTION INTRAMUSCULAR; INTRAVENOUS at 05:36

## 2018-04-11 RX ADMIN — ATORVASTATIN CALCIUM 20 MILLIGRAM(S): 80 TABLET, FILM COATED ORAL at 20:04

## 2018-04-12 LAB
ANION GAP SERPL CALC-SCNC: 10 MMOL/L — SIGNIFICANT CHANGE UP (ref 5–17)
BASOPHILS # BLD AUTO: 0 K/UL — SIGNIFICANT CHANGE UP (ref 0–0.2)
BASOPHILS NFR BLD AUTO: 0 % — SIGNIFICANT CHANGE UP (ref 0–2)
BUN SERPL-MCNC: 12 MG/DL — SIGNIFICANT CHANGE UP (ref 7–23)
CALCIUM SERPL-MCNC: 9 MG/DL — SIGNIFICANT CHANGE UP (ref 8.4–10.5)
CHLORIDE SERPL-SCNC: 99 MMOL/L — SIGNIFICANT CHANGE UP (ref 96–108)
CO2 SERPL-SCNC: 28 MMOL/L — SIGNIFICANT CHANGE UP (ref 22–31)
CREAT SERPL-MCNC: 0.8 MG/DL — SIGNIFICANT CHANGE UP (ref 0.5–1.3)
CULTURE RESULTS: SIGNIFICANT CHANGE UP
CULTURE RESULTS: SIGNIFICANT CHANGE UP
EOSINOPHIL # BLD AUTO: 0 K/UL — SIGNIFICANT CHANGE UP (ref 0–0.5)
EOSINOPHIL NFR BLD AUTO: 2.8 % — SIGNIFICANT CHANGE UP (ref 0–6)
GLUCOSE SERPL-MCNC: 107 MG/DL — HIGH (ref 70–99)
HCT VFR BLD CALC: 26.3 % — LOW (ref 39–50)
HGB BLD-MCNC: 9.2 G/DL — LOW (ref 13–17)
LYMPHOCYTES # BLD AUTO: 0.3 K/UL — LOW (ref 1–3.3)
LYMPHOCYTES # BLD AUTO: 44.5 % — HIGH (ref 13–44)
MCHC RBC-ENTMCNC: 30.2 PG — SIGNIFICANT CHANGE UP (ref 27–34)
MCHC RBC-ENTMCNC: 35 GM/DL — SIGNIFICANT CHANGE UP (ref 32–36)
MCV RBC AUTO: 86.4 FL — SIGNIFICANT CHANGE UP (ref 80–100)
MONOCYTES # BLD AUTO: 0 K/UL — SIGNIFICANT CHANGE UP (ref 0–0.9)
MONOCYTES NFR BLD AUTO: 1.9 % — LOW (ref 2–14)
NEUTROPHILS # BLD AUTO: 0.4 K/UL — LOW (ref 1.8–7.4)
NEUTROPHILS NFR BLD AUTO: 50.7 % — SIGNIFICANT CHANGE UP (ref 43–77)
PC IGA SER IA-ACNC: SIGNIFICANT CHANGE UP
PC IGG SER IA-ACNC: SIGNIFICANT CHANGE UP
PC IGM SER IA-ACNC: SIGNIFICANT CHANGE UP
PLATELET # BLD AUTO: 161 K/UL — SIGNIFICANT CHANGE UP (ref 150–400)
POTASSIUM SERPL-MCNC: 4 MMOL/L — SIGNIFICANT CHANGE UP (ref 3.5–5.3)
POTASSIUM SERPL-SCNC: 4 MMOL/L — SIGNIFICANT CHANGE UP (ref 3.5–5.3)
RBC # BLD: 3.04 M/UL — LOW (ref 4.2–5.8)
RBC # FLD: 16.5 % — HIGH (ref 10.3–14.5)
SODIUM SERPL-SCNC: 137 MMOL/L — SIGNIFICANT CHANGE UP (ref 135–145)
SPECIMEN SOURCE: SIGNIFICANT CHANGE UP
SPECIMEN SOURCE: SIGNIFICANT CHANGE UP
WBC # BLD: 0.7 K/UL — CRITICAL LOW (ref 3.8–10.5)
WBC # FLD AUTO: 0.7 K/UL — CRITICAL LOW (ref 3.8–10.5)

## 2018-04-12 RX ADMIN — Medication 100 MILLIGRAM(S): at 06:01

## 2018-04-12 RX ADMIN — Medication 81 MILLIGRAM(S): at 12:03

## 2018-04-12 RX ADMIN — CEFEPIME 100 MILLIGRAM(S): 1 INJECTION, POWDER, FOR SOLUTION INTRAMUSCULAR; INTRAVENOUS at 06:00

## 2018-04-12 RX ADMIN — CEFEPIME 100 MILLIGRAM(S): 1 INJECTION, POWDER, FOR SOLUTION INTRAMUSCULAR; INTRAVENOUS at 13:25

## 2018-04-12 RX ADMIN — Medication 100 MILLIGRAM(S): at 22:46

## 2018-04-12 RX ADMIN — Medication 100 MILLIGRAM(S): at 13:24

## 2018-04-12 RX ADMIN — CEFEPIME 100 MILLIGRAM(S): 1 INJECTION, POWDER, FOR SOLUTION INTRAMUSCULAR; INTRAVENOUS at 22:46

## 2018-04-13 LAB
ANION GAP SERPL CALC-SCNC: 11 MMOL/L — SIGNIFICANT CHANGE UP (ref 5–17)
BUN SERPL-MCNC: 13 MG/DL — SIGNIFICANT CHANGE UP (ref 7–23)
CALCIUM SERPL-MCNC: 9 MG/DL — SIGNIFICANT CHANGE UP (ref 8.4–10.5)
CHLORIDE SERPL-SCNC: 97 MMOL/L — SIGNIFICANT CHANGE UP (ref 96–108)
CO2 SERPL-SCNC: 27 MMOL/L — SIGNIFICANT CHANGE UP (ref 22–31)
CREAT SERPL-MCNC: 0.94 MG/DL — SIGNIFICANT CHANGE UP (ref 0.5–1.3)
GLUCOSE SERPL-MCNC: 92 MG/DL — SIGNIFICANT CHANGE UP (ref 70–99)
HCT VFR BLD CALC: 25.7 % — LOW (ref 39–50)
HGB BLD-MCNC: 8.7 G/DL — LOW (ref 13–17)
MCHC RBC-ENTMCNC: 29.2 PG — SIGNIFICANT CHANGE UP (ref 27–34)
MCHC RBC-ENTMCNC: 33.8 GM/DL — SIGNIFICANT CHANGE UP (ref 32–36)
MCV RBC AUTO: 86.3 FL — SIGNIFICANT CHANGE UP (ref 80–100)
PLATELET # BLD AUTO: 169 K/UL — SIGNIFICANT CHANGE UP (ref 150–400)
POTASSIUM SERPL-MCNC: 4.4 MMOL/L — SIGNIFICANT CHANGE UP (ref 3.5–5.3)
POTASSIUM SERPL-SCNC: 4.4 MMOL/L — SIGNIFICANT CHANGE UP (ref 3.5–5.3)
RBC # BLD: 2.98 M/UL — LOW (ref 4.2–5.8)
RBC # FLD: 16.5 % — HIGH (ref 10.3–14.5)
SODIUM SERPL-SCNC: 135 MMOL/L — SIGNIFICANT CHANGE UP (ref 135–145)
WBC # BLD: 0.5 K/UL — CRITICAL LOW (ref 3.8–10.5)
WBC # FLD AUTO: 0.5 K/UL — CRITICAL LOW (ref 3.8–10.5)

## 2018-04-13 RX ORDER — FILGRASTIM 480MCG/1.6
480 VIAL (ML) INJECTION ONCE
Qty: 0 | Refills: 0 | Status: COMPLETED | OUTPATIENT
Start: 2018-04-13 | End: 2018-04-13

## 2018-04-13 RX ORDER — CEFEPIME 1 G/1
2000 INJECTION, POWDER, FOR SOLUTION INTRAMUSCULAR; INTRAVENOUS EVERY 12 HOURS
Qty: 0 | Refills: 0 | Status: DISCONTINUED | OUTPATIENT
Start: 2018-04-13 | End: 2018-04-14

## 2018-04-13 RX ORDER — CEFEPIME 1 G/1
2000 INJECTION, POWDER, FOR SOLUTION INTRAMUSCULAR; INTRAVENOUS EVERY 12 HOURS
Qty: 0 | Refills: 0 | Status: DISCONTINUED | OUTPATIENT
Start: 2018-04-13 | End: 2018-04-13

## 2018-04-13 RX ADMIN — Medication 100 MILLIGRAM(S): at 23:40

## 2018-04-13 RX ADMIN — Medication 100 MILLIGRAM(S): at 13:23

## 2018-04-13 RX ADMIN — CEFEPIME 100 MILLIGRAM(S): 1 INJECTION, POWDER, FOR SOLUTION INTRAMUSCULAR; INTRAVENOUS at 17:57

## 2018-04-13 RX ADMIN — Medication 100 MILLIGRAM(S): at 05:03

## 2018-04-13 RX ADMIN — Medication 480 MICROGRAM(S): at 13:23

## 2018-04-13 RX ADMIN — CEFEPIME 100 MILLIGRAM(S): 1 INJECTION, POWDER, FOR SOLUTION INTRAMUSCULAR; INTRAVENOUS at 05:03

## 2018-04-13 RX ADMIN — Medication 81 MILLIGRAM(S): at 13:23

## 2018-04-13 RX ADMIN — ATORVASTATIN CALCIUM 20 MILLIGRAM(S): 80 TABLET, FILM COATED ORAL at 20:39

## 2018-04-14 LAB
ANISOCYTOSIS BLD QL: SLIGHT — SIGNIFICANT CHANGE UP
BASOPHILS # BLD AUTO: 0 K/UL — SIGNIFICANT CHANGE UP (ref 0–0.2)
BASOPHILS NFR BLD AUTO: 0 % — SIGNIFICANT CHANGE UP (ref 0–2)
ELLIPTOCYTES BLD QL SMEAR: SLIGHT — SIGNIFICANT CHANGE UP
EOSINOPHIL # BLD AUTO: 0.02 K/UL — SIGNIFICANT CHANGE UP (ref 0–0.5)
EOSINOPHIL NFR BLD AUTO: 3.2 % — SIGNIFICANT CHANGE UP (ref 0–6)
GIANT PLATELETS BLD QL SMEAR: PRESENT — SIGNIFICANT CHANGE UP
HCT VFR BLD CALC: 24.9 % — LOW (ref 39–50)
HGB BLD-MCNC: 8.5 G/DL — LOW (ref 13–17)
LYMPHOCYTES # BLD AUTO: 0.36 K/UL — LOW (ref 1–3.3)
LYMPHOCYTES # BLD AUTO: 76.5 % — HIGH (ref 13–44)
MACROCYTES BLD QL: SLIGHT — SIGNIFICANT CHANGE UP
MANUAL SMEAR VERIFICATION: SIGNIFICANT CHANGE UP
MCHC RBC-ENTMCNC: 28.3 PG — SIGNIFICANT CHANGE UP (ref 27–34)
MCHC RBC-ENTMCNC: 34.1 GM/DL — SIGNIFICANT CHANGE UP (ref 32–36)
MCV RBC AUTO: 83 FL — SIGNIFICANT CHANGE UP (ref 80–100)
MICROCYTES BLD QL: SLIGHT — SIGNIFICANT CHANGE UP
MONOCYTES # BLD AUTO: 0.02 K/UL — SIGNIFICANT CHANGE UP (ref 0–0.9)
MONOCYTES NFR BLD AUTO: 3.2 % — SIGNIFICANT CHANGE UP (ref 2–14)
NEUTROPHILS # BLD AUTO: 0.04 K/UL — LOW (ref 1.8–7.4)
NEUTROPHILS NFR BLD AUTO: 5.8 % — LOW (ref 43–77)
NEUTS BAND # BLD: 3.2 % — SIGNIFICANT CHANGE UP (ref 0–8)
PLAT MORPH BLD: NORMAL — SIGNIFICANT CHANGE UP
PLATELET # BLD AUTO: 135 K/UL — LOW (ref 150–400)
RBC # BLD: 3 M/UL — LOW (ref 4.2–5.8)
RBC # FLD: 17.1 % — HIGH (ref 10.3–14.5)
RBC BLD AUTO: ABNORMAL
SMUDGE CELLS # BLD: PRESENT — SIGNIFICANT CHANGE UP
VARIANT LYMPHS # BLD: 8.1 % — HIGH (ref 0–6)
WBC # BLD: 0.47 K/UL — CRITICAL LOW (ref 3.8–10.5)
WBC # FLD AUTO: 0.47 K/UL — CRITICAL LOW (ref 3.8–10.5)

## 2018-04-14 RX ORDER — FILGRASTIM 480MCG/1.6
480 VIAL (ML) INJECTION ONCE
Qty: 0 | Refills: 0 | Status: COMPLETED | OUTPATIENT
Start: 2018-04-14 | End: 2018-04-14

## 2018-04-14 RX ADMIN — Medication 100 MILLIGRAM(S): at 05:43

## 2018-04-14 RX ADMIN — Medication 100 MILLIGRAM(S): at 21:07

## 2018-04-14 RX ADMIN — Medication 81 MILLIGRAM(S): at 11:44

## 2018-04-14 RX ADMIN — CEFEPIME 100 MILLIGRAM(S): 1 INJECTION, POWDER, FOR SOLUTION INTRAMUSCULAR; INTRAVENOUS at 05:44

## 2018-04-14 RX ADMIN — Medication 100 MILLIGRAM(S): at 13:45

## 2018-04-14 RX ADMIN — Medication 480 MICROGRAM(S): at 13:45

## 2018-04-15 ENCOUNTER — TRANSCRIPTION ENCOUNTER (OUTPATIENT)
Age: 68
End: 2018-04-15

## 2018-04-15 VITALS
DIASTOLIC BLOOD PRESSURE: 66 MMHG | RESPIRATION RATE: 18 BRPM | TEMPERATURE: 98 F | OXYGEN SATURATION: 98 % | HEART RATE: 76 BPM | SYSTOLIC BLOOD PRESSURE: 126 MMHG

## 2018-04-15 LAB
HCT VFR BLD CALC: 23.4 % — LOW (ref 39–50)
HGB BLD-MCNC: 7.9 G/DL — LOW (ref 13–17)
MCHC RBC-ENTMCNC: 27.9 PG — SIGNIFICANT CHANGE UP (ref 27–34)
MCHC RBC-ENTMCNC: 33.8 GM/DL — SIGNIFICANT CHANGE UP (ref 32–36)
MCV RBC AUTO: 82.7 FL — SIGNIFICANT CHANGE UP (ref 80–100)
PLATELET # BLD AUTO: 150 K/UL — SIGNIFICANT CHANGE UP (ref 150–400)
RBC # BLD: 2.83 M/UL — LOW (ref 4.2–5.8)
RBC # FLD: 17.1 % — HIGH (ref 10.3–14.5)
WBC # BLD: 0.37 K/UL — CRITICAL LOW (ref 3.8–10.5)
WBC # FLD AUTO: 0.37 K/UL — CRITICAL LOW (ref 3.8–10.5)

## 2018-04-15 PROCEDURE — 87040 BLOOD CULTURE FOR BACTERIA: CPT

## 2018-04-15 PROCEDURE — 85610 PROTHROMBIN TIME: CPT

## 2018-04-15 PROCEDURE — 86900 BLOOD TYPING SEROLOGIC ABO: CPT

## 2018-04-15 PROCEDURE — 85730 THROMBOPLASTIN TIME PARTIAL: CPT

## 2018-04-15 PROCEDURE — 84100 ASSAY OF PHOSPHORUS: CPT

## 2018-04-15 PROCEDURE — 83615 LACTATE (LD) (LDH) ENZYME: CPT

## 2018-04-15 PROCEDURE — 85027 COMPLETE CBC AUTOMATED: CPT

## 2018-04-15 PROCEDURE — 82746 ASSAY OF FOLIC ACID SERUM: CPT

## 2018-04-15 PROCEDURE — 96374 THER/PROPH/DIAG INJ IV PUSH: CPT

## 2018-04-15 PROCEDURE — 87633 RESP VIRUS 12-25 TARGETS: CPT

## 2018-04-15 PROCEDURE — 85045 AUTOMATED RETICULOCYTE COUNT: CPT

## 2018-04-15 PROCEDURE — 84295 ASSAY OF SERUM SODIUM: CPT

## 2018-04-15 PROCEDURE — 81001 URINALYSIS AUTO W/SCOPE: CPT

## 2018-04-15 PROCEDURE — 85014 HEMATOCRIT: CPT

## 2018-04-15 PROCEDURE — 80048 BASIC METABOLIC PNL TOTAL CA: CPT

## 2018-04-15 PROCEDURE — 86850 RBC ANTIBODY SCREEN: CPT

## 2018-04-15 PROCEDURE — 93005 ELECTROCARDIOGRAM TRACING: CPT

## 2018-04-15 PROCEDURE — 87798 DETECT AGENT NOS DNA AMP: CPT

## 2018-04-15 PROCEDURE — 87581 M.PNEUMON DNA AMP PROBE: CPT

## 2018-04-15 PROCEDURE — 82947 ASSAY GLUCOSE BLOOD QUANT: CPT

## 2018-04-15 PROCEDURE — 82607 VITAMIN B-12: CPT

## 2018-04-15 PROCEDURE — 80053 COMPREHEN METABOLIC PANEL: CPT

## 2018-04-15 PROCEDURE — 71045 X-RAY EXAM CHEST 1 VIEW: CPT

## 2018-04-15 PROCEDURE — 83036 HEMOGLOBIN GLYCOSYLATED A1C: CPT

## 2018-04-15 PROCEDURE — 80061 LIPID PANEL: CPT

## 2018-04-15 PROCEDURE — 84443 ASSAY THYROID STIM HORMONE: CPT

## 2018-04-15 PROCEDURE — 83605 ASSAY OF LACTIC ACID: CPT

## 2018-04-15 PROCEDURE — 87486 CHLMYD PNEUM DNA AMP PROBE: CPT

## 2018-04-15 PROCEDURE — 84132 ASSAY OF SERUM POTASSIUM: CPT

## 2018-04-15 PROCEDURE — 83516 IMMUNOASSAY NONANTIBODY: CPT

## 2018-04-15 PROCEDURE — 87086 URINE CULTURE/COLONY COUNT: CPT

## 2018-04-15 PROCEDURE — 86901 BLOOD TYPING SEROLOGIC RH(D): CPT

## 2018-04-15 PROCEDURE — 82803 BLOOD GASES ANY COMBINATION: CPT

## 2018-04-15 PROCEDURE — 82435 ASSAY OF BLOOD CHLORIDE: CPT

## 2018-04-15 PROCEDURE — 84550 ASSAY OF BLOOD/URIC ACID: CPT

## 2018-04-15 PROCEDURE — 99285 EMERGENCY DEPT VISIT HI MDM: CPT | Mod: 25

## 2018-04-15 PROCEDURE — 83540 ASSAY OF IRON: CPT

## 2018-04-15 PROCEDURE — 82330 ASSAY OF CALCIUM: CPT

## 2018-04-15 PROCEDURE — 83735 ASSAY OF MAGNESIUM: CPT

## 2018-04-15 RX ORDER — MOXIFLOXACIN HYDROCHLORIDE TABLETS, 400 MG 400 MG/1
1 TABLET, FILM COATED ORAL
Qty: 20 | Refills: 0 | OUTPATIENT
Start: 2018-04-15 | End: 2018-04-24

## 2018-04-15 RX ORDER — CIPROFLOXACIN LACTATE 400MG/40ML
1 VIAL (ML) INTRAVENOUS
Qty: 10 | Refills: 0 | OUTPATIENT
Start: 2018-04-15 | End: 2018-04-24

## 2018-04-15 RX ORDER — FILGRASTIM 480MCG/1.6
480 VIAL (ML) INJECTION ONCE
Qty: 0 | Refills: 0 | Status: COMPLETED | OUTPATIENT
Start: 2018-04-15 | End: 2018-04-15

## 2018-04-15 RX ADMIN — Medication 100 MILLIGRAM(S): at 05:57

## 2018-04-15 RX ADMIN — Medication 81 MILLIGRAM(S): at 12:31

## 2018-04-15 RX ADMIN — Medication 100 MILLIGRAM(S): at 13:44

## 2018-04-15 RX ADMIN — Medication 480 MICROGRAM(S): at 13:45

## 2018-04-15 NOTE — PROGRESS NOTE ADULT - PROBLEM SELECTOR PLAN 2
- no temps over last 24hr -   - if cts are improved would consider d/c home with oral antibiotics and f/u in office --- final decision once blood counts available
- afebrile  - still neutropenic, effect of Neulasta would be  gone at this point  ID dc'd antibiotics yesterday  Ok for discharge today.  If remains afebrile could be discharged irrespective of the WBC.  Will discuss with ID.
- no fever  - still neutropenic, effect of Neulasta would be  gone at this point  Discussed with ID, will hold antibiotics today, monitor off antibiotics x 24 hours  Will dose Neupogen today.  If remains afebrile, can be discharged tomorrow.  Discussed at length with patient and wife.  - if cts are improved would consider d/c home with oral antibiotics and f/u in office --- final decision once blood counts available
- no temps over last 2 days -   - if cts are improved would consider d/c home with oral antibiotics and f/u in office --- final decision once blood counts available
- no temps over last 48hr -   - if cts are improved would consider d/c home with oral antibiotics and f/u in office --- final decision once blood counts available
- still with low grade fevers responding to tylenol  - no evidence of infection on blood cultures  - await cbc from today ---   - if wbc is increasing may consider d/c -- if still low, would favor monitor pt while in hospital  - pt received Neulasta approx 1 wk ago, would wait few more days prior to initiating neupogen
- t max 99  - still neutropenic, effect of Neulasta would be  gone at this point  ID continued antibiotics yesterday  Favor discontinuing antibiotics, monitoring  for 24 hours.  If remains afebrile could be discharged irrespective of the WBC.  Will discuss with ID.

## 2018-04-15 NOTE — PROGRESS NOTE ADULT - PROBLEM SELECTOR PROBLEM 2
Neutropenic fever

## 2018-04-15 NOTE — PROGRESS NOTE ADULT - PROBLEM SELECTOR PLAN 1
- s/p 2 cda , neulasta  - await CBC --
- s/p 2 cda , neulasta  - await CBC results
- s/p 2 cda , neulasta 2 weeks ago    Cytopenias can be prolonged taking weeks to months to fully recover.
- s/p 2 cda , neulasta 2 weeks ago  , s/p 1 dose Neupogen - no improvement  Cytopenias can be prolonged taking weeks to months to fully recover.  Will dose Neupogen today.
- s/p 2 cda , neulasta 2 weeks ago  , s/p 2 dose Neupogen - no improvement  Cytopenias can be prolonged taking weeks to months to fully recover.  Will dose Neupogen today.  No advantage to remaining in the hospital  Advise  discharge today, f/ uin the office tomorrow.  Discussed with ID, they feel discharge is OK  Discussed use of  antibiotic prophylaxis with ID, they feel this is reasonable.  Will begin Ciprfloxacin 500 mg BID as  outpatieint.  Discussed with NP.  Should f/u inour office tomorrow.
- s/p 2 cda , neulasta, WBC .6, ANC .25 yesterday  - await CBC today
-- s/p 2 CDA

## 2018-04-15 NOTE — PROGRESS NOTE ADULT - PROBLEM SELECTOR PROBLEM 1
Hairy cell leukemia not having achieved remission

## 2018-04-15 NOTE — DISCHARGE NOTE ADULT - PATIENT PORTAL LINK FT
You can access the [facility] Patient Portal, offered by [facility], by registering with the following website: [email or web address]

## 2018-04-15 NOTE — DISCHARGE NOTE ADULT - CARE PROVIDER_API CALL
Sebastián Jones), Hematology; Internal Medicine; Medical Oncology  [address]  [address]  [place] [address]  Phone: [phone]  Fax: [phone]    Adriano Raoms), Infectious Disease  [address]  [place] [address]  Phone: [phone]  Fax: [phone]

## 2018-04-15 NOTE — PROGRESS NOTE ADULT - PROVIDER SPECIALTY LIST ADULT
Heme/Onc
Infectious Disease
Internal Medicine
Pulmonology
Internal Medicine
Heme/Onc

## 2018-04-15 NOTE — DISCHARGE NOTE ADULT - PLAN OF CARE
Resolved Follow-up with Dr. Jones -Oncologist tomorrow 4/15 for further monitoring   Follow -up with infection disease Dr. Ramos call for appointment   Take prophylaxis Cipro 500mg BID as prescribed.  Neutropenic precautions continue home medication as prescribed Follow-up with Dr. Jones -Oncologist tomorrow 4/15 for further monitoring and treatment

## 2018-04-15 NOTE — DISCHARGE NOTE ADULT - CARE PLAN
Principal Discharge DX:	Neutropenic fever  Goal:	Resolved  Assessment and plan of treatment:	Follow-up with Dr. Jones -Oncologist tomorrow 4/15 for further monitoring   Follow -up with infection disease Dr. Ramos call for appointment   Take prophylaxis Cipro 500mg BID as prescribed.  Neutropenic precautions  Secondary Diagnosis:	B12 deficiency  Goal:	continue home medication as prescribed  Secondary Diagnosis:	Hairy cell leukemia not having achieved remission  Assessment and plan of treatment:	Follow-up with Dr. Jones -Oncologist tomorrow 4/15 for further monitoring and treatment

## 2018-04-15 NOTE — PROVIDER CONTACT NOTE (CRITICAL VALUE NOTIFICATION) - ACTION/TREATMENT ORDERED:
will continue to monitor pt
will monitor orders
continut to moitor. wbc, s/p neulasta
continue to monitor patient.  no new orders at this time
notified Lucila ALCANTARA her nurse Gia will monitor orders
Continue to monitor

## 2018-04-15 NOTE — PROGRESS NOTE ADULT - SUBJECTIVE AND OBJECTIVE BOX
Patient is a 67y old  Male who presents with a chief complaint of fevers (07 Apr 2018 21:24)       Pt is seen and examined  pt is awake and out of bed to chair  pt seems comfortable and denies any complaints at this time; no need for tylenol since 4/8; no further fevers      REVIEW OF SYSTEMS:    CONSTITUTIONAL: No weakness, fevers or chills  EYES/ENT: No visual changes;  No vertigo or throat pain   NECK: No pain or stiffness  RESPIRATORY: No cough, wheezing, hemoptysis; No shortness of breath  CARDIOVASCULAR: No chest pain or palpitations  GASTROINTESTINAL: No abdominal or epigastric pain. No nausea, vomiting, or hematemesis; No diarrhea or constipation. No melena or hematochezia.  GENITOURINARY: No dysuria, frequency or hematuria  NEUROLOGICAL: No numbness or weakness  SKIN: No itching, burning, rashes, or lesions     MEDICATIONS  (STANDING):  aspirin enteric coated 81 milliGRAM(s) Oral daily  atorvastatin 20 milliGRAM(s) Oral <User Schedule>  cefepime  IVPB 2000 milliGRAM(s) IV Intermittent every 8 hours  docusate sodium 100 milliGRAM(s) Oral three times a day    MEDICATIONS  (PRN):  acetaminophen   Tablet 650 milliGRAM(s) Oral every 6 hours PRN For Temp greater than 38 C (100.4 F) or MILD pain  senna 2 Tablet(s) Oral at bedtime PRN Constipation      Allergies    No Known Allergies    Intolerances        Vital Signs Last 24 Hrs  T(C): 36.9 (10 Apr 2018 02:23), Max: 37.4 (09 Apr 2018 12:16)  T(F): 98.5 (10 Apr 2018 02:23), Max: 99.3 (09 Apr 2018 12:16)  HR: 78 (10 Apr 2018 02:23) (77 - 83)  BP: 152/61 (10 Apr 2018 02:23) (115/57 - 152/61)  BP(mean): --  RR: 18 (10 Apr 2018 02:23) (18 - 18)  SpO2: 97% (10 Apr 2018 02:23) (97% - 99%)    PHYSICAL EXAM  General: adult in NAD  HEENT: clear oropharynx, anicteric sclera, pink conjunctiva  Neck: supple  CV: normal S1/S2 with no murmur rubs or gallops  Lungs: positive air movement b/l ant lungs,clear to auscultation, no wheezes, no rales  Abdomen: soft non-tender non-distended, no hepatosplenomegaly  Ext: no clubbing cyanosis or edema  Skin: no rashes and no petechiae  Neuro: alert and oriented X 4, no focal deficits    LABS:                          8.7    0.44  )-----------( 126      ( 09 Apr 2018 09:26 )             25.4         Mean Cell Volume : 83.0 fl  Mean Cell Hemoglobin : 28.4 pg  Mean Cell Hemoglobin Concentration : 34.3 gm/dL    04-10    135  |  97  |  12  ----------------------------<  132<H>  3.5   |  25  |  0.75    Ca    8.4      10 Apr 2018 07:13  Mg     2.2     04-09        PT/INR - ( 09 Apr 2018 09:26 )   PT: 14.3 sec;   INR: 1.26 ratio         PTT - ( 09 Apr 2018 09:26 )  PTT:32.1 sec      Vitamin B12, Serum: 1983 pg/mL (04-08 @ 09:50)  Folate, Serum: 16.2 ng/mL (04-08 @ 09:50)  Reticulocyte Percent: 0.5 % (04-08 @ 09:19)    Lactate Dehydrogenase, Serum: 134 U/L (04-08 @ 07:15)        Assessment
Patient is a 67y old  Male who presents with a chief complaint of fevers (07 Apr 2018 21:24)      SUBJECTIVE / OVERNIGHT EVENTS:   Feels better.  Denies CP/SOB/Palpitation/HA.    MEDICATIONS  (STANDING):  aspirin enteric coated 81 milliGRAM(s) Oral daily  atorvastatin 20 milliGRAM(s) Oral <User Schedule>  cefepime  IVPB 2000 milliGRAM(s) IV Intermittent every 12 hours  docusate sodium 100 milliGRAM(s) Oral three times a day    MEDICATIONS  (PRN):  acetaminophen   Tablet 650 milliGRAM(s) Oral every 6 hours PRN For Temp greater than 38 C (100.4 F) or MILD pain  senna 2 Tablet(s) Oral at bedtime PRN Constipation        CAPILLARY BLOOD GLUCOSE        I&O's Summary    12 Apr 2018 07:01  -  13 Apr 2018 07:00  --------------------------------------------------------  IN: 640 mL / OUT: 0 mL / NET: 640 mL        PHYSICAL EXAM:  GENERAL: NAD, well-developed  HEAD:  Atraumatic, Normocephalic  NECK: Supple, No JVD  CHEST/LUNG: Clear to auscultation bilaterally; No wheezing.  HEART: Regular rate and rhythm; No murmurs, rubs, or gallops  ABDOMEN: Soft, Nontender, Nondistended; Bowel sounds present  EXTREMITIES:   No clubbing, cyanosis, or edema  NEUROLOGY: AAO X 3  SKIN: No rashes    LABS:                        8.7    0.5   )-----------( 169      ( 13 Apr 2018 07:12 )             25.7     04-13    135  |  97  |  13  ----------------------------<  92  4.4   |  27  |  0.94    Ca    9.0      13 Apr 2018 07:12              CAPILLARY BLOOD GLUCOSE        04-07 @ 21:44  Culture-urine --  Culture results   No growth at 5 days.  method type --  Organism --  Organism Identification --  Specimen source .Blood Blood-Peripheral  04-07 @ 20:48  Culture-urine --  Culture results   No growth  method type --  Organism --  Organism Identification --  Specimen source .Urine Clean Catch (Midstream)           04-07 @ 21:44  Culture blood --  Culture results   No growth at 5 days.  Gram stain --  Gram stain blood --  Method type --  Organism --  Organism identification --  Specimen source .Blood Blood-Peripheral   04-07 @ 20:48  Culture blood --  Culture results   No growth  Gram stain --  Gram stain blood --  Method type --  Organism --  Organism identification --  Specimen source .Urine Clean Catch (Midstream)      RADIOLOGY & ADDITIONAL TESTS:    Imaging Personally Reviewed:    Consultant(s) Notes Reviewed:      Care Discussed with Consultants/Other Providers:
CC: f/u for febrile neutropenia    Patient feeling well, no Resp/GI Sxs    REVIEW OF SYSTEMS:  All other review of systems negative (Comprehensive ROS)    Antimicrobials Day # 6  cefepime  IVPB 2000 milliGRAM(s) IV Intermittent every 8 hours    Other Medications Reviewed    Vital Signs Last 24 Hrs  T(F): 98.8 (12 Apr 2018 07:24), Max: 98.8 (12 Apr 2018 07:24)  HR: 72 (12 Apr 2018 07:24) (72 - 77)  BP: 103/62 (12 Apr 2018 07:24) (103/62 - 123/49)  BP(mean): --  RR: 18 (12 Apr 2018 07:24) (18 - 18)  SpO2: 96% (12 Apr 2018 07:24) (96% - 99%)    PHYSICAL EXAM:  General: alert, no acute distress  Eyes:  anicteric, no conjunctival injection, no discharge  Oropharynx: no lesions or injection 	  Neck: supple, without adenopathy  Lungs: clear to auscultation  Heart: regular rate and rhythm; no murmur, rubs or gallops  Abdomen: soft, nondistended, nontender, without mass or organomegaly  Skin: no lesions  Extremities: no clubbing, cyanosis, or edema  Neurologic: alert, oriented, moves all extremities    LAB RESULTS:                        9.2    0.7   )-----------( 161      ( 12 Apr 2018 08:14 )             26.3   04-12    137  |  99  |  12  ----------------------------<  107<H>  4.0   |  28  |  0.80    Ca    9.0      12 Apr 2018 08:14    MICROBIOLOGY:  RECENT CULTURES:  04-07 @ 21:44 .Blood Blood-Peripheral     No growth to date.    04-07 @ 20:48 .Urine Clean Catch (Midstream)     No growth    Rapid RVP Result: NotDete (04.08.18 @ 16:32)    RADIOLOGY REVIEWED:  Xray Chest 1 View AP/PA (04.07.18 @ 16:08) >  No acute disease.
CC: f/u for febrile neutropenia    Patient feeling well, no Resp/GI Sxs    REVIEW OF SYSTEMS:  All other review of systems negative (Comprehensive ROS)    Antimicrobials Day # 8  cefepime     Other Medications Reviewed    Vital Signs Last 24 Hrs  T(F): 98.3 (14 Apr 2018 11:41), Max: 99.8 (14 Apr 2018 02:08)  HR: 75 (14 Apr 2018 11:41) (75 - 96)  BP: 125/62 (14 Apr 2018 11:41) (118/65 - 138/66)  BP(mean): --  RR: 18 (14 Apr 2018 11:41) (18 - 18)  SpO2: 97% (14 Apr 2018 11:41) (95% - 100%)    PHYSICAL EXAM:  General: alert, no acute distress  Eyes:  anicteric, no conjunctival injection, no discharge  Oropharynx: no lesions or injection 	  Neck: supple, without adenopathy  Lungs: clear to auscultation  Heart: regular rate and rhythm; no murmur, rubs or gallops  Abdomen: soft, nondistended, nontender, without mass or organomegaly  Skin: no lesions  Extremities: no clubbing, cyanosis, or edema  Neurologic: alert, oriented, moves all extremities    LAB RESULTS:                        8.5    0.47  )-----------( 135      ( 14 Apr 2018 07:09 )             24.9   04-13    135  |  97  |  13  ----------------------------<  92  4.4   |  27  |  0.94    Ca    9.0      13 Apr 2018 07:12      MICROBIOLOGY:  RECENT CULTURES:  04-07 @ 21:44 .Blood Blood-Peripheral     No growth to date.    04-07 @ 20:48 .Urine Clean Catch (Midstream)     No growth    Rapid RVP Result: NotDete (04.08.18 @ 16:32)    RADIOLOGY REVIEWED:  Xray Chest 1 View AP/PA (04.07.18 @ 16:08) >  No acute disease.
CC: f/u for febrile neutropenia    Patient feeling well, no Resp/GI Sxs    REVIEW OF SYSTEMS:  All other review of systems negative (Comprehensive ROS)    Antimicrobials off  Other Medications Reviewed    Vital Signs Last 24 Hrs  T(F): 98.8 (15 Apr 2018 12:53), Max: 99.7 (15 Apr 2018 00:29)  HR: 69 (15 Apr 2018 12:53) (69 - 78)  BP: 131/66 (15 Apr 2018 12:53) (130/68 - 155/72)  BP(mean): --  RR: 18 (15 Apr 2018 12:53) (18 - 18)  SpO2: 99% (15 Apr 2018 12:53) (98% - 99%)    PHYSICAL EXAM:  General: alert, no acute distress  Eyes:  anicteric, no conjunctival injection, no discharge  Oropharynx: no lesions or injection 	  Neck: supple, without adenopathy  Lungs: clear to auscultation  Heart: regular rate and rhythm; no murmur, rubs or gallops  Abdomen: soft, nondistended, nontender, without mass or organomegaly  Skin: no lesions  Extremities: no clubbing, cyanosis, or edema  Neurologic: alert, oriented, moves all extremities    LAB RESULTS:                        7.9    0.37  )-----------( 150      ( 15 Apr 2018 09:20 )             23.4       MICROBIOLOGY:  RECENT CULTURES:  04-07 @ 21:44 .Blood Blood-Peripheral     No growth to date.    04-07 @ 20:48 .Urine Clean Catch (Midstream)     No growth    Rapid RVP Result: NotDete (04.08.18 @ 16:32)    RADIOLOGY REVIEWED:  Xray Chest 1 View AP/PA (04.07.18 @ 16:08) >  No acute disease.
CC: f/u for febrile neutropenia    Patient reports feeling well, no Resp Sxs    REVIEW OF SYSTEMS:  All other review of systems negative (Comprehensive ROS)    Antimicrobials Day # 4  cefepime  IVPB 2000 milliGRAM(s) IV Intermittent every 8 hours    Other Medications Reviewed    Vital Signs Last 24 Hrs  T(F): 98.3 (10 Apr 2018 08:37), Max: 98.5 (10 Apr 2018 02:23)  HR: 81 (10 Apr 2018 08:37) (78 - 83)  BP: 116/64 (10 Apr 2018 08:37) (116/64 - 152/61)  BP(mean): --  RR: 18 (10 Apr 2018 08:37) (18 - 18)  SpO2: 96% (10 Apr 2018 08:37) (96% - 99%)    PHYSICAL EXAM:  General: alert, no acute distress  Eyes:  anicteric, no conjunctival injection, no discharge  Oropharynx: no lesions or injection 	  Neck: supple, without adenopathy  Lungs: clear to auscultation  Heart: regular rate and rhythm; no murmur, rubs or gallops  Abdomen: soft, nondistended, nontender, without mass or organomegaly  Skin: no lesions  Extremities: no clubbing, cyanosis, or edema  Neurologic: alert, oriented, moves all extremities    LAB RESULTS:                        8.7    0.44  )-----------( 126      ( 2018 09:26 )             25.4   04-10    135  |  97  |  12  ----------------------------<  132<H>  3.5   |  25  |  0.75    Ca    8.4      10 Apr 2018 07:13  Mg     2.2               Urinalysis Basic - ( 2018 17:39 )    Color: Yellow / Appearance: Clear / S.019 / pH: x  Gluc: x / Ketone: Negative  / Bili: Negative / Urobili: 1 mg/dL   Blood: x / Protein: 30 mg/dL / Nitrite: Negative   Leuk Esterase: Negative / RBC: 2-5 /HPF / WBC 2-5 /HPF   Sq Epi: x / Non Sq Epi: x / Bacteria: Few /HPF    MICROBIOLOGY:  RECENT CULTURES:   @ 21:44 .Blood Blood-Peripheral     No growth to date.     @ 20:48 .Urine Clean Catch (Midstream)     No growth    Rapid RVP Result: NotDete (18 @ 16:32)    RADIOLOGY REVIEWED:  Xray Chest 1 View AP/PA (18 @ 16:08) >  No acute disease.
CC: f/u for febrile neutropenia    Patient reports feeling well, only occas cough, no other resp, GI or  sxs    REVIEW OF SYSTEMS:  All other review of systems negative (Comprehensive ROS)    Antimicrobials Day # 3  cefepime  IVPB 2000 milliGRAM(s) IV Intermittent every 8 hours    Other Medications Reviewed    T(F): 99.3 (18 @ 12:16), Max: 100.5 (18 @ 19:40)  HR: 77 (18 @ 12:16)  BP: 115/57 (18 @ 12:16)  RR: 18 (18 @ 12:16)  SpO2: 97% (18 @ 12:16)  Wt(kg): --    PHYSICAL EXAM:  General: alert, no acute distress  Eyes:  anicteric, no conjunctival injection, no discharge  Oropharynx: no lesions or injection 	  Neck: supple, without adenopathy  Lungs: clear to auscultation  Heart: regular rate and rhythm; no murmur, rubs or gallops  Abdomen: soft, nondistended, nontender, without mass or organomegaly  Skin: no lesions  Extremities: no clubbing, cyanosis, or edema  Neurologic: alert, oriented, moves all extremities    LAB RESULTS:                        8.7    0.44  )-----------( 126      ( 2018 09:26 )             25.4     -    135  |  99  |  12  ----------------------------<  131<H>  4.2   |  27  |  0.86    Ca    8.8      2018 07:24  Phos  2.5     -  Mg     2.2         TPro  7.0  /  Alb  3.1<L>  /  TBili  0.9  /  DBili  x   /  AST  19  /  ALT  43  /  AlkPhos  72  -    LIVER FUNCTIONS - ( 2018 07:15 )  Alb: 3.1 g/dL / Pro: 7.0 g/dL / ALK PHOS: 72 U/L / ALT: 43 U/L RC / AST: 19 U/L / GGT: x           Urinalysis Basic - ( 2018 17:39 )    Color: Yellow / Appearance: Clear / S.019 / pH: x  Gluc: x / Ketone: Negative  / Bili: Negative / Urobili: 1 mg/dL   Blood: x / Protein: 30 mg/dL / Nitrite: Negative   Leuk Esterase: Negative / RBC: 2-5 /HPF / WBC 2-5 /HPF   Sq Epi: x / Non Sq Epi: x / Bacteria: Few /HPF    MICROBIOLOGY:  RECENT CULTURES:   @ 21:44 .Blood Blood-Peripheral     No growth to date.     @ 20:48 .Urine Clean Catch (Midstream)     No growth    Rapid RVP Result: NotDete (18 @ 16:32)    RADIOLOGY REVIEWED:  Xray Chest 1 View AP/PA (18 @ 16:08) >  No acute disease.
CC: f/u for neutropenia fever    Patient reports feeling well just wants to go     REVIEW OF SYSTEMS:  All other review of systems negative (Comprehensive ROS)    Antimicrobials Day #  :  cefepime  IVPB 2000 milliGRAM(s) IV Intermittent every 8 hours    Other Medications Reviewed    T(F): 98 (04-11-18 @ 15:50), Max: 98.8 (04-10-18 @ 22:07)  HR: 74 (04-11-18 @ 15:50)  BP: 116/56 (04-11-18 @ 15:50)  RR: 18 (04-11-18 @ 15:50)  SpO2: 99% (04-11-18 @ 15:50)  Wt(kg): --    PHYSICAL EXAM:  General: alert, no acute distress  Eyes:  anicteric, no conjunctival injection, no discharge  Oropharynx: no lesions or injection 	  Neck: supple, without adenopathy  Lungs: clear to auscultation  Heart: regular rate and rhythm; no murmur, rubs or gallops  Abdomen: soft, nondistended, nontender, without mass or organomegaly  Skin: no lesions  Extremities: no clubbing, cyanosis, or edema  Neurologic: alert, oriented, moves all extremities    LAB RESULTS:                        7.9    0.76  )-----------( 121      ( 11 Apr 2018 09:38 )             23.6     04-10    135  |  97  |  12  ----------------------------<  132<H>  3.5   |  25  |  0.75    Ca    8.4      10 Apr 2018 07:13          MICROBIOLOGY:  RECENT CULTURES:  04-07 @ 21:44 .Blood Blood-Peripheral     No growth to date.      04-07 @ 20:48 .Urine Clean Catch (Midstream)     No growth          RADIOLOGY REVIEWED:    < from: Xray Chest 1 View AP/PA (04.07.18 @ 16:08) >    IMPRESSION:    No acute disease.          < end of copied text >    Assessment:  Patient with hairy cell leukemia, neutropenia fever now quelled with cefepime. no obvious source of infection is apparent  Plan: continue cefepime until anc goes up
CC: f/u for neutropenic fever    Patient reports feels fine    REVIEW OF SYSTEMS:  All other review of systems negative (Comprehensive ROS)    Antimicrobials Day #  :7  cefepime  IVPB 2000 milliGRAM(s) IV Intermittent every 12 hours    Other Medications Reviewed    T(F): 98 (04-13-18 @ 15:43), Max: 98.8 (04-12-18 @ 19:15)  HR: 85 (04-13-18 @ 15:43)  BP: 136/69 (04-13-18 @ 15:43)  RR: 18 (04-13-18 @ 15:43)  SpO2: 100% (04-13-18 @ 15:43)  Wt(kg): --    PHYSICAL EXAM:  General: alert, no acute distress  Eyes:  anicteric, no conjunctival injection, no discharge  Oropharynx: no lesions or injection 	  Neck: supple, without adenopathy  Lungs: clear to auscultation  Heart: regular rate and rhythm; no murmur, rubs or gallops  Abdomen: soft, nondistended, nontender, without mass or organomegaly  Skin: no lesions  Extremities: no clubbing, cyanosis, or edema  Neurologic: alert, oriented, moves all extremities    LAB RESULTS:                        8.7    0.5   )-----------( 169      ( 13 Apr 2018 07:12 )             25.7     04-13    135  |  97  |  13  ----------------------------<  92  4.4   |  27  |  0.94    Ca    9.0      13 Apr 2018 07:12          MICROBIOLOGY:  RECENT CULTURES:    Rapid Respiratory Viral Panel (04.08.18 @ 16:32)    Rapid RVP Result: NotDetec: The [employer] RVP Rapid uses polymerase chain reaction (PCR) and melt  curve analysis to screen for adenovirus; coronavirus HKU1, NL63, 229E,  OC43; human metapneumovirus (hMPV); human enterovirus/rhinovirus  (Entero/RV); influenza A; influenza A/H1;influenza A/H3; influenza  A/H1-2009; influenza B; parainfluenza viruses 1, 2, 3, 4; respiratory  syncytial virus; Bordetella pertussis; Mycoplasma pneumoniae; and  Chlamydophila pneumoniae.    Culture - Blood (04.07.18 @ 21:44)    Specimen Source: .Blood Blood-Peripheral    Culture Results:   No growth at 5 days.  < from: Xray Chest 1 View AP/PA (04.07.18 @ 16:08) >  IMPRESSION:    No acute disease.        < end of copied text >        Assessment:  Patient with neutropenic fever after tx for hcl, no source of infection is apparent. patient with lower wbc today than yesterday so will keep cefepime through tomorrow in hopes that his wbc will rise with neupogen and  he remains afebrile and well. Per r/w dr tripathi he will stay neutropenic for weeks to come.   Plan: will keep cefepime for full 7 days through tomorrow  await repeat wbc tomorrow after neupogen and hope to stop antibiotics if wbc rise a bit
Follow-up Pulm Progress Note    No new respiratory events overnight.  Denies increased SOB, chest pain, cough or mucus. feels well.  no focal complaints.  no fever    Medications:  MEDICATIONS  (STANDING):  aspirin enteric coated 81 milliGRAM(s) Oral daily  atorvastatin 20 milliGRAM(s) Oral <User Schedule>  cefepime  IVPB 2000 milliGRAM(s) IV Intermittent every 8 hours  docusate sodium 100 milliGRAM(s) Oral three times a day    MEDICATIONS  (PRN):  acetaminophen   Tablet 650 milliGRAM(s) Oral every 6 hours PRN For Temp greater than 38 C (100.4 F) or MILD pain  senna 2 Tablet(s) Oral at bedtime PRN Constipation      Vent settings (if applicable)      Vital Signs Last 24 Hrs  T(C): 37.1 (12 Apr 2018 07:24), Max: 37.1 (12 Apr 2018 07:24)  T(F): 98.8 (12 Apr 2018 07:24), Max: 98.8 (12 Apr 2018 07:24)  HR: 72 (12 Apr 2018 07:24) (72 - 77)  BP: 103/62 (12 Apr 2018 07:24) (103/62 - 136/67)  BP(mean): --  RR: 18 (12 Apr 2018 07:24) (18 - 18)  SpO2: 96% (12 Apr 2018 07:24) (96% - 99%)          04-11 @ 07:01  -  04-12 @ 07:00  --------------------------------------------------------  IN: 600 mL / OUT: 0 mL / NET: 600 mL          LABS:                        9.2    0.7   )-----------( 161      ( 12 Apr 2018 08:14 )             26.3     04-12    137  |  99  |  12  ----------------------------<  107<H>  4.0   |  28  |  0.80    Ca    9.0      12 Apr 2018 08:14            CAPILLARY BLOOD GLUCOSE                  CULTURES:  Culture Results:   No growth to date. (04-07 @ 21:44)  Culture Results:   No growth to date. (04-07 @ 21:44)  Culture Results:   No growth (04-07 @ 20:48)    Most recent blood culture -- 04-07 @ 21:44   -- -- .Blood Blood-Peripheral 04-07 @ 21:44  Most recent blood culture -- 04-07 @ 20:48   -- -- .Urine Clean Catch (Midstream) 04-07 @ 20:48      Physical Examination:  Awake and alert, generally comfortable  HEENT: unremarkable  PULM: Clear to auscultation bilaterally, no significant sputum production  CVS: Regular rate and rhythm, no murmurs, rubs, or gallops  Abd:  soft, non tender  Extrem: No CCE    RADIOLOGY REVIEWED  CXR:    CT chest:
Follow-up Pulm Progress Note    No new respiratory events overnight.  Denies increased SOB, chest pain, cough or mucus. no respiratory complaints    Medications:  MEDICATIONS  (STANDING):  aspirin enteric coated 81 milliGRAM(s) Oral daily  atorvastatin 20 milliGRAM(s) Oral <User Schedule>  cefepime  IVPB 2000 milliGRAM(s) IV Intermittent every 8 hours  docusate sodium 100 milliGRAM(s) Oral three times a day  [id number] Injectable 480 MICROGram(s) SubCutaneous once    MEDICATIONS  (PRN):  acetaminophen   Tablet 650 milliGRAM(s) Oral every 6 hours PRN For Temp greater than 38 C (100.4 F) or MILD pain  senna 2 Tablet(s) Oral at bedtime PRN Constipation      Vent settings (if applicable)      Vital Signs Last 24 Hrs  T(C): 36.7 (13 Apr 2018 07:30), Max: 37.1 (12 Apr 2018 19:15)  T(F): 98.1 (13 Apr 2018 07:30), Max: 98.8 (12 Apr 2018 19:15)  HR: 82 (13 Apr 2018 07:30) (75 - 82)  BP: 137/60 (13 Apr 2018 07:30) (109/56 - 137/60)  BP(mean): --  RR: 18 (13 Apr 2018 07:30) (18 - 18)  SpO2: 100% (13 Apr 2018 07:30) (96% - 100%)          04-12 @ 07:01  -  04-13 @ 07:00  --------------------------------------------------------  IN: 640 mL / OUT: 0 mL / NET: 640 mL          LABS:                        8.7    0.5   )-----------( 169      ( 13 Apr 2018 07:12 )             25.7     04-13    135  |  97  |  13  ----------------------------<  92  4.4   |  27  |  0.94    Ca    9.0      13 Apr 2018 07:12            CAPILLARY BLOOD GLUCOSE                  CULTURES:  Culture Results:   No growth at 5 days. (04-07 @ 21:44)  Culture Results:   No growth at 5 days. (04-07 @ 21:44)  Culture Results:   No growth (04-07 @ 20:48)        Physical Examination:  Awake and alert, generally comfortable  HEENT: unremarkable  PULM: Clear to auscultation bilaterally, no significant sputum production  CVS: Regular rate and rhythm, no murmurs, rubs, or gallops  Abd:  soft, non tender  Extrem: No CCE    RADIOLOGY REVIEWED  CXR:    CT chest:
Follow-up Pulm Progress Note    The patient was seen and examined. Notes reviewed and discussed with staff/team as applicable      No new respiratory events overnight.  Had slight low grade temp overnight but feels ok    Denies: SOB, Chest pain, increased cough, colored phlegm, hemoptysis, N/V/D, neck stiffness, dysuria  ROS otherwise within normal limits    Vital Signs Last 24 Hrs  T(C): 36.7 (14 Apr 2018 07:18), Max: 37.7 (14 Apr 2018 02:08)  T(F): 98.1 (14 Apr 2018 07:18), Max: 99.8 (14 Apr 2018 02:08)  HR: 82 (14 Apr 2018 07:18) (75 - 96)  BP: 124/64 (14 Apr 2018 07:18) (118/65 - 138/66)  BP(mean): --  RR: 18 (14 Apr 2018 07:18) (18 - 18)  SpO2: 98% (14 Apr 2018 07:18) (95% - 100%)          04-13 @ 07:01  -  04-14 @ 07:00  --------------------------------------------------------  IN: 530 mL / OUT: 0 mL / NET: 530 mL          Medications:  MEDICATIONS  (STANDING):  aspirin enteric coated 81 milliGRAM(s) Oral daily  atorvastatin 20 milliGRAM(s) Oral <User Schedule>  cefepime  IVPB 2000 milliGRAM(s) IV Intermittent every 12 hours  docusate sodium 100 milliGRAM(s) Oral three times a day    MEDICATIONS  (PRN):  acetaminophen   Tablet 650 milliGRAM(s) Oral every 6 hours PRN For Temp greater than 38 C (100.4 F) or MILD pain  senna 2 Tablet(s) Oral at bedtime PRN Constipation      Allergies    No Known Allergies      Physical Examination:    Pleasant white man, NAD  Neck: no JVD, LAD, accessory muscle use  PULM: Clear to auscultation bilaterally, no wheezes, rales, rhonchi  CVS: Regular rate and rhythm, S1S2, no murmurs, rubs, or gallops  Abdomen: soft, NT  Extremities: no edema  Neuro: non focal      LABS:                        8.7    0.5   )-----------( 169      ( 13 Apr 2018 07:12 )             25.7     04-13    135  |  97  |  13  ----------------------------<  92  4.4   |  27  |  0.94    Ca    9.0      13 Apr 2018 07:12            CAPILLARY BLOOD GLUCOSE                  CULTURES:  Culture Results:   No growth at 5 days. (04-07 @ 21:44)  Culture Results:   No growth at 5 days. (04-07 @ 21:44)  Culture Results:   No growth (04-07 @ 20:48)
Follow-up Pulm Progress Note    The patient was seen and examined. Notes reviewed and discussed with staff/team as applicable      No new respiratory events overnight. Feels ok.     Denies: SOB, Chest pain, increased cough, colored phlegm, hemoptysis, N/V/D, neck stiffness, dysuria      Vital Signs Last 24 Hrs  T(C): 36.9 (11 Apr 2018 06:16), Max: 37.2 (10 Apr 2018 17:06)  T(F): 98.4 (11 Apr 2018 06:16), Max: 98.9 (10 Apr 2018 17:06)  HR: 80 (11 Apr 2018 06:16) (78 - 86)  BP: 121/64 (11 Apr 2018 06:16) (110/60 - 142/71)  BP(mean): --  RR: 18 (11 Apr 2018 06:16) (18 - 18)  SpO2: 97% (11 Apr 2018 06:16) (95% - 98%)          04-10 @ 07:01  -  04-11 @ 07:00  --------------------------------------------------------  IN: 1030 mL / OUT: 0 mL / NET: 1030 mL          Medications:  MEDICATIONS  (STANDING):  aspirin enteric coated 81 milliGRAM(s) Oral daily  atorvastatin 20 milliGRAM(s) Oral <User Schedule>  cefepime  IVPB 2000 milliGRAM(s) IV Intermittent every 8 hours  docusate sodium 100 milliGRAM(s) Oral three times a day    MEDICATIONS  (PRN):  acetaminophen   Tablet 650 milliGRAM(s) Oral every 6 hours PRN For Temp greater than 38 C (100.4 F) or MILD pain  senna 2 Tablet(s) Oral at bedtime PRN Constipation      Allergies    No Known Allergies      Physical Examination:    Pleasant man, NAD  Neck: LAD, accessory muscle use  PULM: Clear to auscultation bilaterally, no wheezes, rales, rhonchi  CVS: Regular rate and rhythm, S1S2, no murmurs, rubs, or gallops  Abdomen: soft  Extremities: no edema  Neuro: non focal      LABS:                        7.8    0.67  )-----------( 118      ( 10 Apr 2018 09:48 )             23.9     04-10    135  |  97  |  12  ----------------------------<  132<H>  3.5   |  25  |  0.75    Ca    8.4      10 Apr 2018 07:13  Mg     2.2     04-09            CAPILLARY BLOOD GLUCOSE        PT/INR - ( 09 Apr 2018 09:26 )   PT: 14.3 sec;   INR: 1.26 ratio         PTT - ( 09 Apr 2018 09:26 )  PTT:32.1 sec          CULTURES:  Culture Results:   No growth to date. (04-07 @ 21:44)  Culture Results:   No growth to date. (04-07 @ 21:44)  Culture Results:   No growth (04-07 @ 20:48)    Most recent blood culture -- 04-07 @ 21:44   -- -- .Blood Blood-Peripheral 04-07 @ 21:44  Most recent blood culture -- 04-07 @ 20:48   -- -- .Urine Clean Catch (Midstream) 04-07 @ 20:48
Patient is a 67y old  Male who presents with a chief complaint of fevers (07 Apr 2018 21:24)      SUBJECTIVE / OVERNIGHT EVENTS:   Feels better.  Denies CP/SOB/Palpitation/HA.    MEDICATIONS  (STANDING):  aspirin enteric coated 81 milliGRAM(s) Oral daily  atorvastatin 20 milliGRAM(s) Oral <User Schedule>  cefepime  IVPB 2000 milliGRAM(s) IV Intermittent every 8 hours  docusate sodium 100 milliGRAM(s) Oral three times a day    MEDICATIONS  (PRN):  acetaminophen   Tablet 650 milliGRAM(s) Oral every 6 hours PRN For Temp greater than 38 C (100.4 F) or MILD pain  senna 2 Tablet(s) Oral at bedtime PRN Constipation        CAPILLARY BLOOD GLUCOSE        I&O's Summary    08 Apr 2018 07:01  -  09 Apr 2018 07:00  --------------------------------------------------------  IN: 100 mL / OUT: 0 mL / NET: 100 mL    09 Apr 2018 07:01  -  09 Apr 2018 22:32  --------------------------------------------------------  IN: 830 mL / OUT: 100 mL / NET: 730 mL        PHYSICAL EXAM:  GENERAL: NAD, well-developed  HEAD:  Atraumatic, Normocephalic  NECK: Supple, No JVD  CHEST/LUNG: Clear to auscultation bilaterally; No wheezing.  HEART: Regular rate and rhythm; No murmurs, rubs, or gallops  ABDOMEN: Soft, Nontender, Nondistended; Bowel sounds present  EXTREMITIES:   No clubbing, cyanosis, or edema  NEUROLOGY: AAO X 3  SKIN: No rashes    LABS:                        8.7    0.44  )-----------( 126      ( 09 Apr 2018 09:26 )             25.4     04-09    135  |  99  |  12  ----------------------------<  131<H>  4.2   |  27  |  0.86    Ca    8.8      09 Apr 2018 07:24  Phos  2.5     04-08  Mg     2.2     04-09    TPro  7.0  /  Alb  3.1<L>  /  TBili  0.9  /  DBili  x   /  AST  19  /  ALT  43  /  AlkPhos  72  04-08    PT/INR - ( 09 Apr 2018 09:26 )   PT: 14.3 sec;   INR: 1.26 ratio         PTT - ( 09 Apr 2018 09:26 )  PTT:32.1 sec        CAPILLARY BLOOD GLUCOSE        04-07 @ 21:44  Culture-urine --  Culture results   No growth to date.  method type --  Organism --  Organism Identification --  Specimen source .Blood Blood-Peripheral  04-07 @ 20:48  Culture-urine --  Culture results   No growth  method type --  Organism --  Organism Identification --  Specimen source .Urine Clean Catch (Midstream)           04-07 @ 21:44  Culture blood --  Culture results   No growth to date.  Gram stain --  Gram stain blood --  Method type --  Organism --  Organism identification --  Specimen source .Blood Blood-Peripheral   04-07 @ 20:48  Culture blood --  Culture results   No growth  Gram stain --  Gram stain blood --  Method type --  Organism --  Organism identification --  Specimen source .Urine Clean Catch (Midstream)      RADIOLOGY & ADDITIONAL TESTS:    Imaging Personally Reviewed:    Consultant(s) Notes Reviewed:      Care Discussed with Consultants/Other Providers:
Patient is a 67y old  Male who presents with a chief complaint of fevers (07 Apr 2018 21:24)      SUBJECTIVE / OVERNIGHT EVENTS:   Feels better.  Denies CP/SOB/Palpitation/HA.    MEDICATIONS  (STANDING):  aspirin enteric coated 81 milliGRAM(s) Oral daily  atorvastatin 20 milliGRAM(s) Oral <User Schedule>  cefepime  IVPB 2000 milliGRAM(s) IV Intermittent every 8 hours  docusate sodium 100 milliGRAM(s) Oral three times a day    MEDICATIONS  (PRN):  acetaminophen   Tablet 650 milliGRAM(s) Oral every 6 hours PRN For Temp greater than 38 C (100.4 F) or MILD pain  senna 2 Tablet(s) Oral at bedtime PRN Constipation        CAPILLARY BLOOD GLUCOSE        I&O's Summary    09 Apr 2018 07:01  -  10 Apr 2018 07:00  --------------------------------------------------------  IN: 830 mL / OUT: 100 mL / NET: 730 mL    10 Apr 2018 07:01  -  10 Apr 2018 21:13  --------------------------------------------------------  IN: 930 mL / OUT: 0 mL / NET: 930 mL        PHYSICAL EXAM:  GENERAL: NAD, well-developed  HEAD:  Atraumatic, Normocephalic  NECK: Supple, No JVD  CHEST/LUNG: Clear to auscultation bilaterally; No wheezing.  HEART: Regular rate and rhythm; No murmurs, rubs, or gallops  ABDOMEN: Soft, Nontender, Nondistended; Bowel sounds present  EXTREMITIES:   No clubbing, cyanosis, or edema  NEUROLOGY: AAO X 3  SKIN: No rashes    LABS:                        7.8    0.67  )-----------( 118      ( 10 Apr 2018 09:48 )             23.9     04-10    135  |  97  |  12  ----------------------------<  132<H>  3.5   |  25  |  0.75    Ca    8.4      10 Apr 2018 07:13  Mg     2.2     04-09      PT/INR - ( 09 Apr 2018 09:26 )   PT: 14.3 sec;   INR: 1.26 ratio         PTT - ( 09 Apr 2018 09:26 )  PTT:32.1 sec        CAPILLARY BLOOD GLUCOSE        04-07 @ 21:44  Culture-urine --  Culture results   No growth to date.  method type --  Organism --  Organism Identification --  Specimen source .Blood Blood-Peripheral  04-07 @ 20:48  Culture-urine --  Culture results   No growth  method type --  Organism --  Organism Identification --  Specimen source .Urine Clean Catch (Midstream)           04-07 @ 21:44  Culture blood --  Culture results   No growth to date.  Gram stain --  Gram stain blood --  Method type --  Organism --  Organism identification --  Specimen source .Blood Blood-Peripheral   04-07 @ 20:48  Culture blood --  Culture results   No growth  Gram stain --  Gram stain blood --  Method type --  Organism --  Organism identification --  Specimen source .Urine Clean Catch (Midstream)      RADIOLOGY & ADDITIONAL TESTS:    Imaging Personally Reviewed:    Consultant(s) Notes Reviewed:      Care Discussed with Consultants/Other Providers:
Patient is a 67y old  Male who presents with a chief complaint of fevers (07 Apr 2018 21:24)      SUBJECTIVE / OVERNIGHT EVENTS:   Feels better.  Denies CP/SOB/Palpitation/HA.    MEDICATIONS  (STANDING):  aspirin enteric coated 81 milliGRAM(s) Oral daily  atorvastatin 20 milliGRAM(s) Oral <User Schedule>  cefepime  IVPB 2000 milliGRAM(s) IV Intermittent every 8 hours  docusate sodium 100 milliGRAM(s) Oral three times a day    MEDICATIONS  (PRN):  acetaminophen   Tablet 650 milliGRAM(s) Oral every 6 hours PRN For Temp greater than 38 C (100.4 F) or MILD pain  senna 2 Tablet(s) Oral at bedtime PRN Constipation        CAPILLARY BLOOD GLUCOSE        I&O's Summary    10 Apr 2018 07:01  -  11 Apr 2018 07:00  --------------------------------------------------------  IN: 1030 mL / OUT: 0 mL / NET: 1030 mL    11 Apr 2018 07:01  -  11 Apr 2018 11:38  --------------------------------------------------------  IN: 120 mL / OUT: 0 mL / NET: 120 mL        PHYSICAL EXAM:  GENERAL: NAD, well-developed  HEAD:  Atraumatic, Normocephalic  NECK: Supple, No JVD  CHEST/LUNG: Clear to auscultation bilaterally; No wheezing.  HEART: Regular rate and rhythm; No murmurs, rubs, or gallops  ABDOMEN: Soft, Nontender, Nondistended; Bowel sounds present  EXTREMITIES:   No clubbing, cyanosis, or edema  NEUROLOGY: AAO X 3  SKIN: No rashes    LABS:                        7.8    0.67  )-----------( 118      ( 10 Apr 2018 09:48 )             23.9     04-10    135  |  97  |  12  ----------------------------<  132<H>  3.5   |  25  |  0.75    Ca    8.4      10 Apr 2018 07:13              CAPILLARY BLOOD GLUCOSE        04-07 @ 21:44  Culture-urine --  Culture results   No growth to date.  method type --  Organism --  Organism Identification --  Specimen source .Blood Blood-Peripheral  04-07 @ 20:48  Culture-urine --  Culture results   No growth  method type --  Organism --  Organism Identification --  Specimen source .Urine Clean Catch (Midstream)           04-07 @ 21:44  Culture blood --  Culture results   No growth to date.  Gram stain --  Gram stain blood --  Method type --  Organism --  Organism identification --  Specimen source .Blood Blood-Peripheral   04-07 @ 20:48  Culture blood --  Culture results   No growth  Gram stain --  Gram stain blood --  Method type --  Organism --  Organism identification --  Specimen source .Urine Clean Catch (Midstream)      RADIOLOGY & ADDITIONAL TESTS:    Imaging Personally Reviewed:    Consultant(s) Notes Reviewed:      Care Discussed with Consultants/Other Providers:
Patient is a 67y old  Male who presents with a chief complaint of fevers (07 Apr 2018 21:24)      SUBJECTIVE / OVERNIGHT EVENTS:   Feels better.  Denies CP/SOB/Palpitation/HA.    MEDICATIONS  (STANDING):  aspirin enteric coated 81 milliGRAM(s) Oral daily  atorvastatin 20 milliGRAM(s) Oral <User Schedule>  cefepime  IVPB 2000 milliGRAM(s) IV Intermittent every 8 hours  docusate sodium 100 milliGRAM(s) Oral three times a day    MEDICATIONS  (PRN):  acetaminophen   Tablet 650 milliGRAM(s) Oral every 6 hours PRN For Temp greater than 38 C (100.4 F) or MILD pain  senna 2 Tablet(s) Oral at bedtime PRN Constipation        CAPILLARY BLOOD GLUCOSE        I&O's Summary    11 Apr 2018 07:01  -  12 Apr 2018 07:00  --------------------------------------------------------  IN: 600 mL / OUT: 0 mL / NET: 600 mL    12 Apr 2018 07:01  -  12 Apr 2018 22:36  --------------------------------------------------------  IN: 540 mL / OUT: 0 mL / NET: 540 mL        PHYSICAL EXAM:  GENERAL: NAD, well-developed  HEAD:  Atraumatic, Normocephalic  NECK: Supple, No JVD  CHEST/LUNG: Clear to auscultation bilaterally; No wheezing.  HEART: Regular rate and rhythm; No murmurs, rubs, or gallops  ABDOMEN: Soft, Nontender, Nondistended; Bowel sounds present  EXTREMITIES:   No clubbing, cyanosis, or edema  NEUROLOGY: AAO X 3  SKIN: No rashes    LABS:                        9.2    0.7   )-----------( 161      ( 12 Apr 2018 08:14 )             26.3     04-12    137  |  99  |  12  ----------------------------<  107<H>  4.0   |  28  |  0.80    Ca    9.0      12 Apr 2018 08:14              CAPILLARY BLOOD GLUCOSE        04-07 @ 21:44  Culture-urine --  Culture results   No growth at 5 days.  method type --  Organism --  Organism Identification --  Specimen source .Blood Blood-Peripheral  04-07 @ 20:48  Culture-urine --  Culture results   No growth  method type --  Organism --  Organism Identification --  Specimen source .Urine Clean Catch (Midstream)           04-07 @ 21:44  Culture blood --  Culture results   No growth at 5 days.  Gram stain --  Gram stain blood --  Method type --  Organism --  Organism identification --  Specimen source .Blood Blood-Peripheral   04-07 @ 20:48  Culture blood --  Culture results   No growth  Gram stain --  Gram stain blood --  Method type --  Organism --  Organism identification --  Specimen source .Urine Clean Catch (Midstream)      RADIOLOGY & ADDITIONAL TESTS:    Imaging Personally Reviewed:    Consultant(s) Notes Reviewed:      Care Discussed with Consultants/Other Providers:
Patient is a 67y old  Male who presents with a chief complaint of fevers (07 Apr 2018 21:24)      SUBJECTIVE / OVERNIGHT EVENTS:   Feels better.  Denies CP/SOB/Palpitation/HA.    MEDICATIONS  (STANDING):  aspirin enteric coated 81 milliGRAM(s) Oral daily  atorvastatin 20 milliGRAM(s) Oral <User Schedule>  docusate sodium 100 milliGRAM(s) Oral three times a day    MEDICATIONS  (PRN):  acetaminophen   Tablet 650 milliGRAM(s) Oral every 6 hours PRN For Temp greater than 38 C (100.4 F) or MILD pain  senna 2 Tablet(s) Oral at bedtime PRN Constipation        CAPILLARY BLOOD GLUCOSE        I&O's Summary    13 Apr 2018 07:01  -  14 Apr 2018 07:00  --------------------------------------------------------  IN: 530 mL / OUT: 0 mL / NET: 530 mL    14 Apr 2018 07:01  -  14 Apr 2018 23:54  --------------------------------------------------------  IN: 240 mL / OUT: 0 mL / NET: 240 mL        PHYSICAL EXAM:  GENERAL: NAD, well-developed  HEAD:  Atraumatic, Normocephalic  NECK: Supple, No JVD  CHEST/LUNG: Clear to auscultation bilaterally; No wheezing.  HEART: Regular rate and rhythm; No murmurs, rubs, or gallops  ABDOMEN: Soft, Nontender, Nondistended; Bowel sounds present  EXTREMITIES:   No clubbing, cyanosis, or edema  NEUROLOGY: AAO X 3  SKIN: No rashes    LABS:                        8.5    0.47  )-----------( 135      ( 14 Apr 2018 07:09 )             24.9     04-13    135  |  97  |  13  ----------------------------<  92  4.4   |  27  |  0.94    Ca    9.0      13 Apr 2018 07:12              CAPILLARY BLOOD GLUCOSE                    RADIOLOGY & ADDITIONAL TESTS:    Imaging Personally Reviewed:    Consultant(s) Notes Reviewed:      Care Discussed with Consultants/Other Providers:
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 No nausea, vomiting, or hematemesis; No diarrhea or constipation. No melena or hematochezia.  GENITOURINARY: No dysuria, frequency or hematuria  NEUROLOGICAL: No numbness or weakness  SKIN: No itching, burning, rashes, or lesions     MEDICATIONS  (STANDING):  aspirin enteric coated 81 milliGRAM(s) Oral daily  atorvastatin 20 milliGRAM(s) Oral <User Schedule>  cefepime  IVPB 2000 milliGRAM(s) IV Intermittent every 8 hours  docusate sodium 100 milliGRAM(s) Oral three times a day    MEDICATIONS  (PRN):  acetaminophen   Tablet 650 milliGRAM(s) Oral every 6 hours PRN For Temp greater than 38 C (100.4 F) or MILD pain  senna 2 Tablet(s) Oral at bedtime PRN Constipation      Allergies    No Known Allergies    Intolerances        Vital Signs Last 24 Hrs  T(C): 37.2 (09 Apr 2018 07:54), Max: 38.1 (08 Apr 2018 19:40)  T(F): 99 (09 Apr 2018 07:54), Max: 100.5 (08 Apr 2018 19:40)  HR: 82 (09 Apr 2018 07:54) (81 - 113)  BP: 129/60 (09 Apr 2018 07:54) (119/62 - 143/68)  BP(mean): --  RR: 18 (09 Apr 2018 07:54) (18 - 18)  SpO2: 97% (09 Apr 2018 07:54) (97% - 100%)    PHYSICAL EXAM  General: adult in NAD  HEENT: clear oropharynx, anicteric sclera, pink conjunctiva  Neck: supple  CV: normal S1/S2 with no murmur rubs or gallops  Lungs: positive air movement b/l ant lungs,clear to auscultation, no wheezes, no rales  Abdomen: soft non-tender non-distended, no hepatosplenomegaly  Ext: no clubbing cyanosis or edema  Skin: no rashes and no petechiae  Neuro: alert and oriented X 4, no focal deficits    LABS:                          8.9    0.4   )-----------( 157      ( 08 Apr 2018 07:15 )             26.4         Mean Cell Volume : 87.0 fl  Mean Cell Hemoglobin : 29.4 pg  Mean Cell Hemoglobin Concentration : 33.8 gm/dL      04-08    133<L>  |  98  |  12  ----------------------------<  136<H>  3.8   |  23  |  0.84    Ca    8.3<L>      08 Apr 2018 07:15  Phos  2.5     04-08  Mg     1.9     04-08    TPro  7.0  /  Alb  3.1<L>  /  TBili  0.9  /  DBili  x   /  AST  19  /  ALT  43  /  AlkPhos  72  04-08      PT/INR - ( 07 Apr 2018 16:35 )   PT: 14.6 sec;   INR: 1.34 ratio       PTT - ( 07 Apr 2018 16:35 )  PTT:29.9 sec    Vitamin B12, Serum: 1983 pg/mL (04-08 @ 09:50)  Folate, Serum: 16.2 ng/mL (04-08 @ 09:50)  Reticulocyte Percent: 0.5 % (04-08 @ 09:19)    Lactate Dehydrogenase, Serum: 134 U/L (04-08 @ 07:15)    Culture - Blood (04.07.18 @ 21:44)    Specimen Source: .Blood Blood-Peripheral    Culture Results:   No growth to date.    Culture - Urine (04.07.18 @ 20:48)    Specimen Source: .Urine Clean Catch (Midstream)    Culture Results:   No growth    Rapid Respiratory Viral Panel (04.08.18 @ 16:32)    Rapid RVP Result: NotDetec: The [employer] RVP Rapid uses polymerase chain reaction (PCR) and melt  curve analysis to screen for adenovirus; coronavirus HKU1, NL63, 229E,  OC43; human metapneumovirus (hMPV); human enterovirus/rhinovirus  (Entero/RV); influenza A; influenza A/H1;influenza A/H3; influenza  A/H1-2009; influenza B; parainfluenza viruses 1, 2, 3, 4; respiratory  syncytial virus; Bordetella pertussis; Mycoplasma pneumoniae; and  Chlamydophila pneumoniae.      Assessment
Patient is a 67y old  Male who presents with a chief complaint of fevers (07 Apr 2018 21:24), s/p chemo for Hairy cell leukemia; Neutropenic fevers       Pt is seen and examined  pt is awake and lying in bed, AM bloods being drawn now  pt seems comfortable and denies any complaints at this time; no further fevers over last 2 days      REVIEW OF SYSTEMS:    CONSTITUTIONAL: No weakness, fevers or chills  EYES/ENT: No visual changes;  No vertigo or throat pain   NECK: No pain or stiffness  RESPIRATORY: No cough, wheezing, hemoptysis; No shortness of breath  CARDIOVASCULAR: No chest pain or palpitations  GASTROINTESTINAL: No abdominal or epigastric pain. No nausea, vomiting, or hematemesis; No diarrhea or constipation. No melena or hematochezia.  GENITOURINARY: No dysuria, frequency or hematuria  NEUROLOGICAL: No numbness or weakness  SKIN: No itching, burning, rashes, or lesions     MEDICATIONS  (STANDING):  aspirin enteric coated 81 milliGRAM(s) Oral daily  atorvastatin 20 milliGRAM(s) Oral <User Schedule>  cefepime  IVPB 2000 milliGRAM(s) IV Intermittent every 8 hours  docusate sodium 100 milliGRAM(s) Oral three times a day    MEDICATIONS  (PRN):  acetaminophen   Tablet 650 milliGRAM(s) Oral every 6 hours PRN For Temp greater than 38 C (100.4 F) or MILD pain  senna 2 Tablet(s) Oral at bedtime PRN Constipation      Allergies    No Known Allergies    Intolerances        Vital Signs Last 24 Hrs  T(C): 37.1 (12 Apr 2018 07:24), Max: 37.1 (12 Apr 2018 07:24)  T(F): 98.8 (12 Apr 2018 07:24), Max: 98.8 (12 Apr 2018 07:24)  HR: 72 (12 Apr 2018 07:24) (72 - 84)  BP: 103/62 (12 Apr 2018 07:24) (103/62 - 136/67)  BP(mean): --  RR: 18 (12 Apr 2018 07:24) (18 - 18)  SpO2: 96% (12 Apr 2018 07:24) (96% - 99%)    PHYSICAL EXAM  General: adult in NAD  HEENT: clear oropharynx, anicteric sclera, pink conjunctiva  Neck: supple  CV: normal S1/S2 with no murmur rubs or gallops  Lungs: positive air movement b/l ant lungs,clear to auscultation, no wheezes, no rales  Abdomen: soft non-tender non-distended, no hepatosplenomegaly  Ext: no clubbing cyanosis or edema  Skin: no rashes and no petechiae  Neuro: alert and oriented X 4, no focal deficits    LABS:                          7.9    0.76  )-----------( 121      ( 11 Apr 2018 09:38 )             23.6         Mean Cell Volume : 84.6 fl  Mean Cell Hemoglobin : 28.3 pg  Mean Cell Hemoglobin Concentration : 33.5 gm/dL  Auto Neutrophil # : 0.48 K/uL  Auto Lymphocyte # : 0.24 K/uL  Auto Monocyte # : 0.01 K/uL  Auto Eosinophil # : 0.03 K/uL  Auto Basophil # : 0.00 K/uL  Auto Neutrophil % : 63.7 %  Auto Lymphocyte % : 31.8 %  Auto Monocyte % : 0.9 %  Auto Eosinophil % : 3.6 %  Auto Basophil % : 0.0 %        Vitamin B12, Serum: 1983 pg/mL (04-08 @ 09:50)  Folate, Serum: 16.2 ng/mL (04-08 @ 09:50)  Reticulocyte Percent: 0.5 % (04-08 @ 09:19)    Lactate Dehydrogenase, Serum: 134 U/L (04-08 @ 07:15)        Assessment
Patient is a 67y old  Male who presents with a chief complaint of fevers (2018 21:24)      SUBJECTIVE / OVERNIGHT EVENTS:   Feels better.  Denies CP/SOB/Palpitation/HA.    MEDICATIONS  (STANDING):  aspirin enteric coated 81 milliGRAM(s) Oral daily  atorvastatin 20 milliGRAM(s) Oral <User Schedule>  cefepime  IVPB 2000 milliGRAM(s) IV Intermittent every 8 hours  docusate sodium 100 milliGRAM(s) Oral three times a day    MEDICATIONS  (PRN):  acetaminophen   Tablet 650 milliGRAM(s) Oral every 6 hours PRN For Temp greater than 38 C (100.4 F) or MILD pain  senna 2 Tablet(s) Oral at bedtime PRN Constipation        CAPILLARY BLOOD GLUCOSE        I&O's Summary    2018 07:01  -  2018 18:22  --------------------------------------------------------  IN: 100 mL / OUT: 0 mL / NET: 100 mL        PHYSICAL EXAM:  GENERAL: NAD, well-developed  HEAD:  Atraumatic, Normocephalic  NECK: Supple, No JVD  CHEST/LUNG: Clear to auscultation bilaterally; No wheezing.  HEART: Regular rate and rhythm; No murmurs, rubs, or gallops  ABDOMEN: Soft, Nontender, Nondistended; Bowel sounds present  EXTREMITIES:   No clubbing, cyanosis, or edema  NEUROLOGY: AAO X 3  SKIN: No rashes    LABS:                        8.9    0.4   )-----------( 157      ( 2018 07:15 )             26.4     04-08    133<L>  |  98  |  12  ----------------------------<  136<H>  3.8   |  23  |  0.84    Ca    8.3<L>      2018 07:15  Phos  2.5     04-08  Mg     1.9     04-08    TPro  7.0  /  Alb  3.1<L>  /  TBili  0.9  /  DBili  x   /  AST  19  /  ALT  43  /  AlkPhos  72  04-08    PT/INR - ( 2018 16:35 )   PT: 14.6 sec;   INR: 1.34 ratio         PTT - ( 2018 16:35 )  PTT:29.9 sec      Urinalysis Basic - ( 2018 17:39 )    Color: Yellow / Appearance: Clear / S.019 / pH: x  Gluc: x / Ketone: Negative  / Bili: Negative / Urobili: 1 mg/dL   Blood: x / Protein: 30 mg/dL / Nitrite: Negative   Leuk Esterase: Negative / RBC: 2-5 /HPF / WBC 2-5 /HPF   Sq Epi: x / Non Sq Epi: x / Bacteria: Few /HPF      CAPILLARY BLOOD GLUCOSE                    RADIOLOGY & ADDITIONAL TESTS:    Imaging Personally Reviewed:    Consultant(s) Notes Reviewed:      Care Discussed with Consultants/Other Providers:
Pt is seen and examined  pt is awake and lying in bed/out of bed to chair  pt seems comfortable and denies any complaints at this time        MEDICATIONS  (STANDING):  aspirin enteric coated 81 milliGRAM(s) Oral daily  atorvastatin 20 milliGRAM(s) Oral <User Schedule>  cefepime  IVPB 2000 milliGRAM(s) IV Intermittent every 8 hours  docusate sodium 100 milliGRAM(s) Oral three times a day    MEDICATIONS  (PRN):  acetaminophen   Tablet 650 milliGRAM(s) Oral every 6 hours PRN For Temp greater than 38 C (100.4 F) or MILD pain  senna 2 Tablet(s) Oral at bedtime PRN Constipation      Allergies    No Known Allergies    Intolerances        Vital Signs Last 24 Hrs  T(C): 36.9 (11 Apr 2018 07:49), Max: 37.2 (10 Apr 2018 17:06)  T(F): 98.5 (11 Apr 2018 07:49), Max: 98.9 (10 Apr 2018 17:06)  HR: 84 (11 Apr 2018 07:49) (78 - 86)  BP: 124/63 (11 Apr 2018 07:49) (110/60 - 142/71)  BP(mean): --  RR: 18 (11 Apr 2018 07:49) (18 - 18)  SpO2: 98% (11 Apr 2018 07:49) (95% - 98%)    PHYSICAL EXAM  General: adult in NAD  HEENT: , anicteric sclera, pink conjunctiva  CV: normal S1/S2 with no murmur rubs or gallops  Lungs: positive air movement b/l ant lungs,clear to auscultation, no wheezes, no rales  Abdomen: soft non-tender non-distended, no hepatosplenomegaly  Ext: no clubbing cyanosis or edema  Skin: no rashes and no petechiae  Neuro: alert and oriented X 4, no focal deficits  LABS:                          7.8    0.67  )-----------( 118      ( 10 Apr 2018 09:48 )             23.9         Mean Cell Volume : 85.4 fl  Mean Cell Hemoglobin : 27.9 pg  Mean Cell Hemoglobin Concentration : 32.6 gm/dL  Auto Neutrophil # : 0.25 K/uL  Auto Lymphocyte # : 0.34 K/uL  Auto Monocyte # : 0.05 K/uL  Auto Eosinophil # : 0.03 K/uL  Auto Basophil # : 0.00 K/uL  Auto Neutrophil % : 37.3 %  Auto Lymphocyte % : 50.7 %  Auto Monocyte % : 7.5 %  Auto Eosinophil % : 4.5 %  Auto Basophil % : 0.0 %      04-10    135  |  97  |  12  ----------------------------<  132<H>  3.5   |  25  |  0.75    Ca    8.4      10 Apr 2018 07:13            Vitamin B12, Serum: 1983 pg/mL (04-08 @ 09:50)  Folate, Serum: 16.2 ng/mL (04-08 @ 09:50)  Reticulocyte Percent: 0.5 % (04-08 @ 09:19)  Lactate Dehydrogenase, Serum: 134 U/L (04-08 @ 07:15)          [facility]. hematology 04-08 @ 09:19  0.5 %  --  --  RADIOLOGY & ADDITIONAL STUDIES:
Pt is seen and examined  pt is awake and out of bed to chair  pt seems comfortable and denies any complaints at this time        MEDICATIONS  (STANDING):  aspirin enteric coated 81 milliGRAM(s) Oral daily  atorvastatin 20 milliGRAM(s) Oral <User Schedule>  cefepime  IVPB 2000 milliGRAM(s) IV Intermittent every 12 hours  docusate sodium 100 milliGRAM(s) Oral three times a day    MEDICATIONS  (PRN):  acetaminophen   Tablet 650 milliGRAM(s) Oral every 6 hours PRN For Temp greater than 38 C (100.4 F) or MILD pain  senna 2 Tablet(s) Oral at bedtime PRN Constipation      Allergies    No Known Allergies    Intolerances        Vital Signs Last 24 Hrs  T(C): 36.7 (14 Apr 2018 07:18), Max: 37.7 (14 Apr 2018 02:08)  T(F): 98.1 (14 Apr 2018 07:18), Max: 99.8 (14 Apr 2018 02:08)  HR: 82 (14 Apr 2018 07:18) (75 - 96)  BP: 124/64 (14 Apr 2018 07:18) (118/65 - 138/66)  BP(mean): --  RR: 18 (14 Apr 2018 07:18) (18 - 18)  SpO2: 98% (14 Apr 2018 07:18) (95% - 100%)    PHYSICAL EXAM  General: adult in NAD  HEENT: clear oropharynx, anicteric sclera, pink conjunctiva  Neck: supple  CV: normal S1/S2 with no murmur rubs or gallops  Lungs: positive air movement b/l ant lungs,clear to auscultation, no wheezes, no rales  Abdomen: soft non-tender non-distended, no hepatosplenomegaly  Ext: no clubbing cyanosis or edema  Skin: no rashes and no petechiae  Neuro: alert and oriented X 4, no focal deficits  LABS:                          8.5    0.47  )-----------( 135      ( 14 Apr 2018 07:09 )             24.9         Mean Cell Volume : 83.0 fl  Mean Cell Hemoglobin : 28.3 pg  Mean Cell Hemoglobin Concentration : 34.1 gm/dL  Auto Neutrophil # : x  Auto Lymphocyte # : x  Auto Monocyte # : x  Auto Eosinophil # : x  Auto Basophil # : x  Auto Neutrophil % : x  Auto Lymphocyte % : x  Auto Monocyte % : x  Auto Eosinophil % : x  Auto Basophil % : x      04-13    135  |  97  |  13  ----------------------------<  92  4.4   |  27  |  0.94    Ca    9.0      13 Apr 2018 07:12    RADIOLOGY & ADDITIONAL STUDIES:
Pt is seen and examined  pt is awake and out of bed to chair  pt seems comfortable and denies any complaints at this time        MEDICATIONS  (STANDING):  aspirin enteric coated 81 milliGRAM(s) Oral daily  atorvastatin 20 milliGRAM(s) Oral <User Schedule>  cefepime  IVPB 2000 milliGRAM(s) IV Intermittent every 8 hours  docusate sodium 100 milliGRAM(s) Oral three times a day    MEDICATIONS  (PRN):  acetaminophen   Tablet 650 milliGRAM(s) Oral every 6 hours PRN For Temp greater than 38 C (100.4 F) or MILD pain  senna 2 Tablet(s) Oral at bedtime PRN Constipation      Allergies    No Known Allergies    Intolerances        Vital Signs Last 24 Hrs  T(C): 36.7 (13 Apr 2018 07:30), Max: 37.1 (12 Apr 2018 19:15)  T(F): 98.1 (13 Apr 2018 07:30), Max: 98.8 (12 Apr 2018 19:15)  HR: 82 (13 Apr 2018 07:30) (75 - 82)  BP: 137/60 (13 Apr 2018 07:30) (109/56 - 137/60)  BP(mean): --  RR: 18 (13 Apr 2018 07:30) (18 - 18)  SpO2: 100% (13 Apr 2018 07:30) (96% - 100%)    PHYSICAL EXAM  General: adult in NAD  HEENT: clear oropharynx, anicteric sclera, pink conjunctiva  Neck: supple  CV: normal S1/S2 with no murmur rubs or gallops  Lungs: positive air movement b/l ant lungs,clear to auscultation, no wheezes, no rales  Abdomen: soft non-tender non-distended, no hepatosplenomegaly  Ext: no clubbing cyanosis or edema  Skin: no rashes and no petechiae  Neuro: alert and oriented X 4, no focal deficits  LABS:                          8.7    0.5   )-----------( 169      ( 13 Apr 2018 07:12 )             25.7         Mean Cell Volume : 86.3 fl  Mean Cell Hemoglobin : 29.2 pg  Mean Cell Hemoglobin Concentration : 33.8 gm/dL  Auto Neutrophil # : x  Auto Lymphocyte # : x  Auto Monocyte # : x  Auto Eosinophil # : x  Auto Basophil # : x  Auto Neutrophil % : x  Auto Lymphocyte % : x  Auto Monocyte % : x  Auto Eosinophil % : x  Auto Basophil % : x      04-13    135  |  97  |  13  ----------------------------<  92  4.4   |  27  |  0.94    Ca    9.0      13 Apr 2018 07:12    Vitamin B12, Serum: 1983 pg/mL (04-08 @ 09:50)  Folate, Serum: 16.2 ng/mL (04-08 @ 09:50)  BLOOD SMEAR INTERPRETATION:       RADIOLOGY & ADDITIONAL STUDIES:
Pt is seen and examined  pt is awake and out of bed to chair  pt seems comfortable and denies any complaints at this time        MEDICATIONS  (STANDING):  aspirin enteric coated 81 milliGRAM(s) Oral daily  atorvastatin 20 milliGRAM(s) Oral <User Schedule>  docusate sodium 100 milliGRAM(s) Oral three times a day    MEDICATIONS  (PRN):  acetaminophen   Tablet 650 milliGRAM(s) Oral every 6 hours PRN For Temp greater than 38 C (100.4 F) or MILD pain  senna 2 Tablet(s) Oral at bedtime PRN Constipation      Allergies    No Known Allergies    Intolerances        Vital Signs Last 24 Hrs  T(C): 36.9 (15 Apr 2018 09:04), Max: 37.6 (15 Apr 2018 00:29)  T(F): 98.5 (15 Apr 2018 09:04), Max: 99.7 (15 Apr 2018 00:29)  HR: 74 (15 Apr 2018 09:04) (72 - 78)  BP: 130/68 (15 Apr 2018 09:04) (125/62 - 155/72)  BP(mean): --  RR: 18 (15 Apr 2018 09:04) (18 - 18)  SpO2: 98% (15 Apr 2018 09:04) (97% - 99%)    PHYSICAL EXAM  General: adult in NAD  HEENT: clear oropharynx, anicteric sclera, pink conjunctiva  Neck: supple  CV: normal S1/S2 with no murmur rubs or gallops  Lungs: positive air movement b/l ant lungs,clear to auscultation, no wheezes, no rales  Abdomen: soft non-tender non-distended, no hepatosplenomegaly  Ext: no clubbing cyanosis or edema  Skin: no rashes and no petechiae  Neuro: alert and oriented X 4, no focal deficits  LABS:                          7.9    0.37  )-----------( 150      ( 15 Apr 2018 09:20 )             23.4         Mean Cell Volume : 82.7 fl  Mean Cell Hemoglobin : 27.9 pg  Mean Cell Hemoglobin Concentration : 33.8 gm/dL  Auto Neutrophil # : x  Auto Lymphocyte # : x  Auto Monocyte # : x  Auto Eosinophil # : x  Auto Basophil # : x  Auto Neutrophil % : x  Auto Lymphocyte % : x  Auto Monocyte % : x  Auto Eosinophil % : x  Auto Basophil % : x      RADIOLOGY & ADDITIONAL STUDIES:
Follow-up Pulm Progress Note    The patient was seen and examined. Notes reviewed and discussed with staff/team as applicable      No new respiratory events overnight.  Feels good.    Denies: SOB, Chest pain, increased cough, colored phlegm, hemoptysis, N/V/D, neck stiffness, dysuria  ROS otherwise within normal limits    Vital Signs Last 24 Hrs  T(C): 36.9 (10 Apr 2018 02:23), Max: 37.4 (09 Apr 2018 12:16)  T(F): 98.5 (10 Apr 2018 02:23), Max: 99.3 (09 Apr 2018 12:16)  HR: 78 (10 Apr 2018 02:23) (77 - 83)  BP: 152/61 (10 Apr 2018 02:23) (115/57 - 152/61)  BP(mean): --  RR: 18 (10 Apr 2018 02:23) (18 - 18)  SpO2: 97% (10 Apr 2018 02:23) (97% - 99%)          04-09 @ 07:01  -  04-10 @ 07:00  --------------------------------------------------------  IN: 830 mL / OUT: 100 mL / NET: 730 mL          Medications:  MEDICATIONS  (STANDING):  aspirin enteric coated 81 milliGRAM(s) Oral daily  atorvastatin 20 milliGRAM(s) Oral <User Schedule>  cefepime  IVPB 2000 milliGRAM(s) IV Intermittent every 8 hours  docusate sodium 100 milliGRAM(s) Oral three times a day    MEDICATIONS  (PRN):  acetaminophen   Tablet 650 milliGRAM(s) Oral every 6 hours PRN For Temp greater than 38 C (100.4 F) or MILD pain  senna 2 Tablet(s) Oral at bedtime PRN Constipation      Allergies    No Known Allergies      Physical Examination:    Pleasant man, NAD  Neck: no JVD, LAD, accessory muscle use  PULM: Clear to auscultation bilaterally, no wheezes, rales, rhonchi  CVS: Regular rate and rhythm, S1S2, no murmurs, rubs, or gallops  Abdomen: soft, NT  Extremities: no edema  Neuro: non focal      LABS:                        8.7    0.44  )-----------( 126      ( 09 Apr 2018 09:26 )             25.4     04-09    135  |  99  |  12  ----------------------------<  131<H>  4.2   |  27  |  0.86    Ca    8.8      09 Apr 2018 07:24  Phos  2.5     04-08  Mg     2.2     04-09    TPro  7.0  /  Alb  3.1<L>  /  TBili  0.9  /  DBili  x   /  AST  19  /  ALT  43  /  AlkPhos  72  04-08          CAPILLARY BLOOD GLUCOSE        PT/INR - ( 09 Apr 2018 09:26 )   PT: 14.3 sec;   INR: 1.26 ratio         PTT - ( 09 Apr 2018 09:26 )  PTT:32.1 sec          CULTURES:  Culture Results:   No growth to date. (04-07 @ 21:44)  Culture Results:   No growth to date. (04-07 @ 21:44)  Culture Results:   No growth (04-07 @ 20:48)    Most recent blood culture -- 04-07 @ 21:44   -- -- .Blood Blood-Peripheral 04-07 @ 21:44  Most recent blood culture -- 04-07 @ 20:48   -- -- .Urine Clean Catch (Midstream) 04-07 @ 20:48

## 2018-04-15 NOTE — PROVIDER CONTACT NOTE (CRITICAL VALUE NOTIFICATION) - SITUATION
Pt. has hairy cell leukemia- thomas leiva
Pt has hairy cell lymphoma
Abnormal lab value
WBC 0.5
WBC=0.44- took report for Gia cade

## 2018-04-15 NOTE — PROGRESS NOTE ADULT - ASSESSMENT
ASSESSMENT    immunocompromised host due to underlying hairy cell leukemia and following chemotherapy with neutropenia admitted with neutropenic fever without a clear source of infection - the patient's examination is non-focal and all cultures remain negative - the patient was treated with oral antibiotics in late January - early February for a left lower lobe and lingular pneumonia with CT follow-up showing radiographic improvement    PLAN/RECOMMENDATIONS    stable oxygenation on room air  continue cefepime until neutropenia resolves  no pulmonary medications indicated  bowel regimen  continue to follow WBC count     Deshawn Arboleda MD  Pulmonary Medicine  [phone]
· Assessment	  67M c hx hairy cell leukemia (dx Sept '17) s/p 1 cycle chemo (last dose 3/30), pneumonia (Feb '18) with outpt CT chest (3/19/18) reportedly showing resolution, B12 deficiency (dx'd "yrs ago"), HLD, nephrolithiasis, likely BPH, pw neutropenic fevers and pancytopenia.     Problem/Plan - 1:  ·  Problem: Neutropenic fever.  Plan: - on history and exam, no obvious source of infection  - cont cefepime  - ID/Pul f/up noted.    - neutropenic precautions.      Problem/Plan - 2:  ·  Problem: Pancytopenia.  Plan: - multi component,   - no obvious s/s of bleeding  - s/p neupogen     Problem/Plan - 3:  ·  Problem: B12 deficiency.  Plan: - Cont intranasal spray     Problem/Plan - 4:  ·  Problem: Hairy cell leukemia not having achieved remission.  Plan: - s/p chemo 3/30 by peripheral IV    Hem f/up noted.        Dw family.
ASSESSMENT    immunocompromised host due to underlying hairy cell leukemia and following chemotherapy with neutropenia admitted with neutropenic fever   PLAN/RECOMMENDATIONS    stable oxygenation on room air  continue cefepime until neutropenia resolves  no pulmonary medications indicated  bowel regimen  continue to follow WBC count     Deshawn Arboleda MD  Pulmonary Medicine  [phone]
ASSESSMENT    immunocompromised host due to underlying hairy cell leukemia and following chemotherapy with neutropenia admitted with neutropenic fever   PLAN/RECOMMENDATIONS    stable oxygenation on room air  feels overall well- monitor clinically  no evidence pneumonia  continue cefepime until neutropenia resolves  no pulmonary medications indicated  continue to follow WBC count   dvt prophylaxis  routine gi prophylaxis as necessary  Oxygen saturation greater than 92%      Caitlin Camilo MD  Pulmonary Medicine  [phone]
ASSESSMENT    immunocompromised host due to underlying hairy cell leukemia and following chemotherapy with neutropenia admitted with neutropenic fever .  Looks and feels well.  Afebrile    PLAN/RECOMMENDATIONS    stable oxygenation on room air  feels overall well- monitor clinically  no evidence pneumonia  antibiotics as per ID  no pulmonary medications indicated  continue to follow WBC count   dvt prophylaxis  routine gi prophylaxis as necessary  Oxygen saturation greater than 92%  to receive Neuopegen  dc planning  pt is stable from pulmonary standpoint.  please recall if we can be of further assistance      Caitlin Camilo MD  Pulmonary Medicine  [phone]
ASSESSMENT    immunocompromised host due to underlying hairy cell leukemia and following chemotherapy with neutropenia admitted with neutropenic fever .  Looks and feels well.  Afebrile    PLAN/RECOMMENDATIONS    stable oxygenation on room air  feels overall well- monitor clinically  no evidence pneumonia clinically  antibiotics as per ID  no pulmonary medications indicated  continue to follow WBC count   dvt prophylaxis  routine gi prophylaxis as necessary  Oxygen saturation greater than 92%  dc planning  pt is stable from pulmonary standpoint.    Deshawn Arboleda MD  Pulmonary Medicine  [phone]
HCL on Cladribine, here with neutropenic fever.  Fever with mild URI symptoms- resolved  CXR and RVP negative  Bld Cxs negative  Afebrile, no Sxs at present  No source of infection, but still with  absolute neutropenia    Suggest:  Continue cefepime 2 g q 8 for febrile neutropenia for now  If WBC trending further upward as of tomorrow, can d/c Cefepime (7 days of empiric Tx sufficient in this setting) and consider discharge/outpt f/u  Follow temps and CBC/diff   Neulasta/Neupogen as per Dr Grimm
HCL on Cladribine, here with neutropenic fever.  Fever with mild URI symptoms- resolved  CXR and RVP negative  Bld Cxs negative  Remains afebrile, no Sxs  No source of infection, completed 7 days empiric cefepime  Still neutropenic and in d/w Dr Jones, anticipates prolonged recovery    Suggest:  Will d/c cefepime as planned  Follow temps and CBC/diff   Neupogen as outlined  If remains afebrile, no objection to d/c home in AM  D/w pt and family at length- risk of infection in future again reviewed  D/w Dr Jones
HCL on Cladribine, here with neutropenic fever.  Fever with mild URI symptoms- resolved  CXR and RVP negative  Bld Cxs negative  Remains afebrile, no Sxs  No source of infection, completed 7 days empiric cefepime  Still neutropenic and in d/w Dr Jones, still anticipates prolonged recovery    Suggest:  Given anticipated prolonged neutropenia, no sign of infex at present, no objection to outpt f/u  Cipro or Levaquin prophylaxis an option, has been shown to delay fever, but does not impact outcome  D/w pt and family at length- may require return to hosp with any fever  D/w Dr Jones
HCL on Cladribine, here with neutropenic fever.  S/p outpatient pneumonia in February with reported radiographic and clinical resolution.  Fever with mild URI symptoms  CXR and RVP negative  Bld Cxs negative  His exam remains non focal    Suggest:  Continue cefepime 2 gr q 8 directed at febrile neutropenia  Follow temps and CBC/diff   Neulasta/Neupogen as per Dr Grimm  ID issues, meaning of fever in setting of neutropenia, reviewed with patient and family at bedside
HCL on Cladribine, here with neutropenic fever.  S/p outpatient pneumonia in February with reported radiographic and clinical resolution.  Fever with mild URI symptoms- resolved  CXR and RVP negative  Bld Cxs negative  Afebrile 48 hrs, no Sxs at present  No source of infection at present    Suggest:  Continue cefepime 2 g q 8 for febrile neutropenia  Follow temps and CBC/diff- awaiting today's   Neulasta/Neupogen as per Dr Grimm  Reviewed with patient and family at bedside
· Assessment	  67M c hx hairy cell leukemia (dx Sept '17) s/p 1 cycle chemo (last dose 3/30), pneumonia (Feb '18) with outpt CT chest (3/19/18) reportedly showing resolution, B12 deficiency (dx'd "yrs ago"), HLD, nephrolithiasis, likely BPH, pw neutropenic fevers and pancytopenia.     Problem/Plan - 1:  ·  Problem: Neutropenic fever.  Plan: - on history and exam, no obvious source of infection  - cont cefepime  - ID/Pul f/up noted.    - neutropenic precautions.      Problem/Plan - 2:  ·  Problem: Pancytopenia.  Plan: - multi component,   - no obvious s/s of bleeding  - s/p second neupogen     Problem/Plan - 3:  ·  Problem: B12 deficiency.  Plan: - Cont intranasal spray     Problem/Plan - 4:  ·  Problem: Hairy cell leukemia not having achieved remission.  Plan: - s/p chemo 3/30 by peripheral IV    Hem f/up noted.        Dw family.
· Assessment	  67M c hx hairy cell leukemia (dx Sept '17) s/p 1 cycle chemo (last dose 3/30), pneumonia (Feb '18) with outpt CT chest (3/19/18) reportedly showing resolution, B12 deficiency (dx'd "yrs ago"), HLD, nephrolithiasis, likely BPH, pw neutropenic fevers and pancytopenia.     Problem/Plan - 1:  ·  Problem: Neutropenic fever.  Plan: - on history and exam, no obvious source of infection  - cont cefepime  - f/u blood and urine cultures  -ID eval noted.  - neutropenic precautions.      Problem/Plan - 2:  ·  Problem: Pancytopenia.  Plan: - multi component, likely related to leukemia, chemo, and ?poss b12 def?  - no obvious s/s of bleeding  - No  need for  neupogen/neulasta      Problem/Plan - 3:  ·  Problem: B12 deficiency.  Plan: - Cont intranasal spray     Problem/Plan - 4:  ·  Problem: Hairy cell leukemia not having achieved remission.  Plan: - s/p chemo 3/30 by peripheral IV    Hem eval noted.     Problem/Plan - 5:  ·  Problem: Transaminitis.  Plan: - mild transaminitis with mild coagulopathy  - cont to monitor, if worsening, will need additional workup    Dw family.
· Assessment	  67M c hx hairy cell leukemia (dx Sept '17) s/p 1 cycle chemo (last dose 3/30), pneumonia (Feb '18) with outpt CT chest (3/19/18) reportedly showing resolution, B12 deficiency (dx'd "yrs ago"), HLD, nephrolithiasis, likely BPH, pw neutropenic fevers and pancytopenia.     Problem/Plan - 1:  ·  Problem: Neutropenic fever.  Plan: - on history and exam, no obvious source of infection  - cont cefepime  - f/u blood and urine cultures  -ID f/up noted.  Pul eval noted.  - neutropenic precautions.      Problem/Plan - 2:  ·  Problem: Pancytopenia.  Plan: - multi component, likely related to leukemia, chemo, and ?poss b12 def?  - no obvious s/s of bleeding  - No  need for  neupogen/neulasta      Problem/Plan - 3:  ·  Problem: B12 deficiency.  Plan: - Cont intranasal spray     Problem/Plan - 4:  ·  Problem: Hairy cell leukemia not having achieved remission.  Plan: - s/p chemo 3/30 by peripheral IV    Hem eval noted.     Problem/Plan - 5:  ·  Problem: Transaminitis.  Plan: - mild transaminitis with mild coagulopathy  - cont to monitor, if worsening, will need additional workup    Dw family.
· Assessment	  67M c hx hairy cell leukemia (dx Sept '17) s/p 1 cycle chemo (last dose 3/30), pneumonia (Feb '18) with outpt CT chest (3/19/18) reportedly showing resolution, B12 deficiency (dx'd "yrs ago"), HLD, nephrolithiasis, likely BPH, pw neutropenic fevers and pancytopenia.     Problem/Plan - 1:  ·  Problem: Neutropenic fever.  Plan: - on history and exam, no obvious source of infection  - cont cefepime/ Neutrophil improving.  - ID/Pul f/up noted.    - neutropenic precautions.      Problem/Plan - 2:  ·  Problem: Pancytopenia.  Plan: - multi component,   - no obvious s/s of bleeding  - No  need for  neupogen/neulasta      Problem/Plan - 3:  ·  Problem: B12 deficiency.  Plan: - Cont intranasal spray     Problem/Plan - 4:  ·  Problem: Hairy cell leukemia not having achieved remission.  Plan: - s/p chemo 3/30 by peripheral IV    Hem eval noted.     Problem/Plan - 5:  ·  Problem: Transaminitis.  Plan: - mild transaminitis with mild coagulopathy    Dw family.
· Assessment	  67M c hx hairy cell leukemia (dx Sept '17) s/p 1 cycle chemo (last dose 3/30), pneumonia (Feb '18) with outpt CT chest (3/19/18) reportedly showing resolution, B12 deficiency (dx'd "yrs ago"), HLD, nephrolithiasis, likely BPH, pw neutropenic fevers and pancytopenia.     Problem/Plan - 1:  ·  Problem: Neutropenic fever.  Plan: - on history and exam, no obvious source of infection  - cont cefepime/ Neutrophil improving.  - ID/Pul f/up noted.    - neutropenic precautions.      Problem/Plan - 2:  ·  Problem: Pancytopenia.  Plan: - multi component,   - no obvious s/s of bleeding  - No  need for  neupogen/neulasta at present.     Problem/Plan - 3:  ·  Problem: B12 deficiency.  Plan: - Cont intranasal spray     Problem/Plan - 4:  ·  Problem: Hairy cell leukemia not having achieved remission.  Plan: - s/p chemo 3/30 by peripheral IV    Hem eval noted.     Problem/Plan - 5:  ·  Problem: Transaminitis.  Plan: - mild transaminitis with mild coagulopathy    Dw family.
· Assessment	  67M c hx hairy cell leukemia (dx Sept '17) s/p 1 cycle chemo (last dose 3/30), pneumonia (Feb '18) with outpt CT chest (3/19/18) reportedly showing resolution, B12 deficiency (dx'd "yrs ago"), HLD, nephrolithiasis, likely BPH, pw neutropenic fevers and pancytopenia.     Problem/Plan - 1:  ·  Problem: Neutropenic fever.  Plan: - on history and exam, no obvious source of infection  - cont cefepime/ Neutrophil improving.  - ID/Pul f/up noted.    - neutropenic precautions.      Problem/Plan - 2:  ·  Problem: Pancytopenia.  Plan: - multi component,   - no obvious s/s of bleeding  - No  need for  neupogen/neulasta at present.     Problem/Plan - 3:  ·  Problem: B12 deficiency.  Plan: - Cont intranasal spray     Problem/Plan - 4:  ·  Problem: Hairy cell leukemia not having achieved remission.  Plan: - s/p chemo 3/30 by peripheral IV    Hem eval noted.     Problem/Plan - 5:  ·  Problem: Transaminitis.  Plan: - mild transaminitis with mild coagulopathy    Dw family.

## 2018-04-15 NOTE — PROVIDER CONTACT NOTE (CRITICAL VALUE NOTIFICATION) - PERSON GIVING RESULT:
Babita Ventura
Caesar Hsu
Francesca Nevarez
Nitin Lema [facility]
Tasha Rebolledo
mary pickens
Dirk Almanza-  labs

## 2018-04-15 NOTE — DISCHARGE NOTE ADULT - MEDICATION SUMMARY - MEDICATIONS TO TAKE
I will START or STAY ON the medications listed below when I get home from the hospital:    aspirin 81 mg oral delayed release tablet  -- 1 tab(s) by mouth once a day  -- Indication: For Prevention measures     Lipitor 20 mg oral tablet  -- 1 tab(s) by mouth every other day  -- Indication: For Hyperlipidemia     Zetia 10 mg oral tablet  -- 1 tab(s) by mouth every other day  -- Indication: For Hyperlipidemia     Nascobal 500 mcg/0.1 mL nasal spray  -- 1 spray(s) into nose once a week  -- Indication: For supplement I will START or STAY ON the medications listed below when I get home from the hospital:    aspirin 81 mg oral delayed release tablet  -- 1 tab(s) by mouth once a day  -- Indication: For Prevention measures     Lipitor 20 mg oral tablet  -- 1 tab(s) by mouth every other day  -- Indication: For Hyperlipidemia     Zetia 10 mg oral tablet  -- 1 tab(s) by mouth every other day  -- Indication: For Hyperlipidemia     Cipro 500 mg oral tablet  -- 1 tab(s) by mouth every 12 hours   -- Avoid prolonged or excessive exposure to direct and/or artificial sunlight while taking this medication.  Check with your doctor before becoming pregnant.  Do not take dairy products, antacids, or iron preparations within one hour of this medication.  Finish all this medication unless otherwise directed by prescriber.  Medication should be taken with plenty of water.    -- Indication: For PPX     Nascobal 500 mcg/0.1 mL nasal spray  -- 1 spray(s) into nose once a week  -- Indication: For supplement

## 2018-04-15 NOTE — DISCHARGE NOTE ADULT - HOSPITAL COURSE
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 ID consult, pan cultures negative, completed 7 days of cefepime ....antibiotic prophylaxis Ciprofloxacin 500 mg BID, Neupogen x1 before discharge and follow-up with Dr. Jones Oncologist in the office tomorrow.

## 2020-02-22 ENCOUNTER — EMERGENCY (EMERGENCY)
Facility: HOSPITAL | Age: 70
LOS: 1 days | Discharge: HOME | End: 2020-02-22
Attending: EMERGENCY MEDICINE
Payer: MEDICARE

## 2020-02-22 VITALS
OXYGEN SATURATION: 97 % | SYSTOLIC BLOOD PRESSURE: 164 MMHG | DIASTOLIC BLOOD PRESSURE: 73 MMHG | RESPIRATION RATE: 19 BRPM | TEMPERATURE: 98 F | HEART RATE: 74 BPM

## 2020-02-22 VITALS
TEMPERATURE: 98 F | HEART RATE: 76 BPM | OXYGEN SATURATION: 99 % | SYSTOLIC BLOOD PRESSURE: 185 MMHG | WEIGHT: 199.96 LBS | RESPIRATION RATE: 18 BRPM | DIASTOLIC BLOOD PRESSURE: 74 MMHG | HEIGHT: 71 IN

## 2020-02-22 PROBLEM — C91.40 HAIRY CELL LEUKEMIA NOT HAVING ACHIEVED REMISSION: Chronic | Status: ACTIVE | Noted: 2018-04-07

## 2020-02-22 PROBLEM — E53.8 DEFICIENCY OF OTHER SPECIFIED B GROUP VITAMINS: Chronic | Status: ACTIVE | Noted: 2018-04-07

## 2020-02-22 PROCEDURE — 99283 EMERGENCY DEPT VISIT LOW MDM: CPT | Mod: GC

## 2020-02-22 PROCEDURE — 99282 EMERGENCY DEPT VISIT SF MDM: CPT

## 2020-02-22 RX ORDER — OXYMETAZOLINE HYDROCHLORIDE 0.5 MG/ML
2 SPRAY NASAL ONCE
Refills: 0 | Status: COMPLETED | OUTPATIENT
Start: 2020-02-22 | End: 2020-02-22

## 2020-02-22 RX ADMIN — OXYMETAZOLINE HYDROCHLORIDE 2 SPRAY(S): 0.5 SPRAY NASAL at 03:16

## 2020-02-22 NOTE — ED PROVIDER NOTE - PROGRESS NOTE DETAILS
Elizabeth Goldberger PGY-3: small amount of blood dripping after direct pressure. Will try afrin and further pressure Elizabeth Goldberger PGY-3: no active bleeding. Chip daniel outpt fu

## 2020-02-22 NOTE — ED PROVIDER NOTE - NSFOLLOWUPINSTRUCTIONS_ED_ALL_ED_FT
You were seen in the emergency department for nosebleed. Please follow up with an ENT specialist. Return to the emergency department immediately if you experience heavy bleeding, lightheadedness, difficulty swallowing, chest pain or any other concerning symptoms. You were seen in the emergency department for nosebleed. Please follow up with an ENT specialist. Do not blow your nose. Return to the emergency department immediately if you experience heavy bleeding, lightheadedness, difficulty swallowing, chest pain or any other concerning symptoms.

## 2020-02-22 NOTE — ED PROVIDER NOTE - NSFOLLOWUPCLINICS_GEN_ALL_ED_FT
[facility] ENT  ENT  [address], [address]  [place] [address]  Phone: [phone]  Fax:   Follow Up Time:

## 2020-02-22 NOTE — ED ADULT NURSE NOTE - OBJECTIVE STATEMENT
69y Male A&Ox3 came to ED c/o nose bleed. PMH of ablation on Xarelto. Pt states he was blowing his nose over 1 hour ago and Pt started to nose bleed, pressure has not helped nose bleed to stop, Pt states this is first time this happened after starting on Xarelto on November.  Pt presents with nosebleed, Pt states it is still continuing bleed. Denies chest pain, sob, ha, n/v/d, abdominal pain, f/c, urinary symptoms, hematuria. Upon examination, full facial symmetry, PERRL, no JVD or trach deviation, lung sounds clear and adequate chest rise, S1 and S2 heart sounds present, +2 pulses in all extremities with full ROM and equal strength, cap refill <2 seconds, ABD soft, non distended and non tender, pelvis intact.

## 2020-02-22 NOTE — ED PROVIDER NOTE - CLINICAL SUMMARY MEDICAL DECISION MAKING FREE TEXT BOX
69m w hx afib s/p ablation on Eliquis here today for epistaxis x2 hrs. Pt appears NAD w/ normal VS. Nares w bright red blood no active bleeding. Plan for direct pressure and reeval

## 2020-02-22 NOTE — ED PROVIDER NOTE - PATIENT PORTAL LINK FT
You can access the FollowMyHealth Patient Portal offered by [facility] by registering at the following website: [email or web address]. By joining [employer]’s FollowMyHealth portal, you will also be able to view your health information using other applications (apps) compatible with our system.

## 2020-02-22 NOTE — ED ADULT NURSE NOTE - SUICIDE SCREENING DEPRESSION
PICC removal scheduled with [facility] on 12/05/18 at 10am. Patient notified via voicemail message.    Eden Leos RN  11/30/2018 12:52 PM   Negative

## 2020-02-22 NOTE — ED PROVIDER NOTE - ATTENDING CONTRIBUTION TO CARE
R sided nose bleed for last hour after blowing nose.  On Xarelto.  No other complaints, denying chest pains, dyspnea, lightheadedness.  No trauma to nose.  On exam patient with clot in R nare, no blood dripping down oropharynx, no other pertinent findings.  Will continue to monitor in Emergency Department for further bleeding, cauterize/pack as needed, appears to be anterior bleeding, no evidence of significant acute blood loss.

## 2020-02-22 NOTE — ED PROVIDER NOTE - OBJECTIVE STATEMENT
69m w hx afib s/p ablation on Eliquis here today for epistaxis x2 hrs. Started in right nostril and progressed to left. Denies trauma to area. No lightheadedness. No URI sx.

## 2020-12-07 NOTE — ED PROVIDER NOTE - CPE EDP NEURO NORM
Supplements for inflammation: Arnica Tabs, bromelain with tumeric, alpha lipoic acid, garlic     Over the counter pain creams: Biofreeze, Bengay, tiger balm, two old goat, lidocaine gel,  Absorbine Veterinary Liniment Gel Topical Analgesic Sore Muscle and Joint Pain Relief    Recommend lotions: eucerin, eucerin for diabetics, aquaphor, A&D ointment, gold bond for diabetics, sween, Dexter's Bees all purpose baby ointment,  urea 40 with aloe (found on amazon.com)    Shoe recommendations: (try 6pm.com, zappos.Arista Power , nordstromrack.Arista Power, or shoes.Arista Power for discounted prices) you can visit [employer]SW shoes in [place]  or [employer] in the [facility] (there are also several shoe brand outlets in the [facility])    Asics (GT 2000 or gel foundations), new balance stability type shoes (such as the 940 series), saucony (stabil c3),  Smith (GTS or Beast or transcend), propet (tennis shoe)    Sofft Brand, baretraps (women) Mary&Ferdinand (men), clarks, crocs, aerosoles, naturalizers, SAS, ecco, born, sergey raf, reza (dress shoes)    Vionic, burkenstocks, fitflops, propet, baretraps (sandals)    Nike comfort thong sandals, crocs, propet (house shoes)    Nail Home remedy:  Vicks Vapor rub or Emuaid to nails for easier manageability    Occasional soaks for 15-20 mins in luke warm water with 1/2 cup of listerine and 1 cup of apple cider vinegar are ok You may add several drops of oil of oregano or tea tree oil as well    Understanding Plantar Fasciitis    Plantar fasciitis is a condition that causes foot and heel pain. The plantar fascia is a tough band of tissue that runs across the bottom of the foot from the heel to the toes. This tissue pulls on the heel bone. It supports the arch of the foot as it pushes off the ground. If the tissue becomes irritated or red and swollen (inflamed), it is called plantar fasciitis.  How to say it  PLAN-tuhr fa-see-IY-tis   What causes plantar fasciitis?  Plantar fasciitis most often occurs from  overusing the plantar fascia. The tissue may become damaged from activities that put repeated stress on the heel and foot. Or it may wear down over time with age and ankle stiffness. You are more likely to have plantar fasciitis if you:  · Do activities that require a lot of running, jumping, or dancing  · Have a job that requires being on your feet for long periods  · Are overweight or obese  · Have certain foot problems, such as a tight Achilles tendon, flat feet, or high arches  · Often wear poorly fitting shoes  Symptoms of plantar fasciitis  The condition most often causes pain in the heel and the bottom of the foot. The pain may occur when you take your first steps in the morning. It may get better as you walk throughout the day. But as you continue to put weight on the foot, the pain often returns. Pain may also occur after standing or sitting for long periods.  Treating plantar fasciitis  Treatments for plantar fasciitis include:  · Resting the foot. This involves limiting movements that make your foot hurt. You may also need to avoid certain sports and types of work for a time.  · Using cold packs. Put an ice pack on the heel and foot to help reduce pain and swelling.  · Taking pain medicines. Prescription and over-the-counter pain medicines can help relieve pain and swelling.  · Using heel cups or foot inserts (orthotics). These are placed in the shoes to help support the heel or arch and cushion the heel. You may also be told to buy proper-fitting shoes with good arch support and cushioned soles.  · Taping the foot. This supports the arch and limits the movement of the plantar fascia to help relieve symptoms.  · Wearing a night splint. This stretches the plantar fascia and leg muscles while you sleep. This may help relieve pain.  · Doing exercises and physical therapy. These stretch and strengthen the plantar fascia and the muscles in the leg that support the heel and foot.  · Getting shots of medicine  into the foot. These may help relieve symptoms for a time.  · Having surgery. This may be needed if other treatments fail to relieve symptoms. During surgery, the surgeon may partially cut the plantar fascia to release tension.  Possible complications of plantar fasciitis  Without proper care and treatment, healing may take longer than normal. Also, symptoms may continue or get worse. Over time, the plantar fascia may be damaged. This can make it hard to walk or even stand without pain.  When to call your healthcare provider  Call your healthcare provider right away if you have any of these:  · Fever of 100.4°F (38°C) or higher, or as directed  · Symptoms that dont get better with treatment, or get worse  · New symptoms, such as numbness, tingling, or weakness in the foot   © 2074-0525 [employer]. [address] [place] [address], [place] [address]. All rights reserved. This information is not intended as a substitute for professional medical care. Always follow your healthcare professional's instructions.        Treating Plantar Fasciitis  First, your healthcare provider tries to determine the cause of your problem in order to suggest ways to relieve pain. If your pain is due to poor foot mechanics, custom-made shoe inserts (orthoses) may help.    Reduce symptoms  · To relieve mild symptoms, try aspirin, ibuprofen, or other medicines as directed. Rubbing ice on the affected area may also help.  · To reduce severe pain and swelling, your healthcare provider may prescribe pills or injections or a walking cast in some instances. Physical therapy, such as ultrasound or a daily stretching program, may also be recommended. Surgery is rarely required.  · To reduce symptoms caused by poor foot mechanics, your foot may be taped. This supports the arch and temporarily controls movement. Night splints may also help by stretching the fascia.  Control movement  If taping helps, your healthcare provider may prescribe  orthoses. Built from plaster casts of your feet, these inserts control the way your foot moves. As a result, your symptoms should go away.  Reduce overuse  Every time your foot strikes the ground, the plantar fascia is stretched. You can reduce the strain on the plantar fascia and the possibility of overuse by following these suggestions:  · Lose any excess weight.  · Avoid running on hard or uneven ground.  · Use orthoses at all times in your shoes and house slippers.  If surgery is needed  Your healthcare provider may consider surgery if other types of treatment don't control your pain. During surgery, the plantar fascia is partially cut to release tension. As you heal, fibrous tissue fills the space between the heel bone and the plantar fascia.   © 4209-9342 [employer]. [address] [place] [address], [place] [address]. All rights reserved. This information is not intended as a substitute for professional medical care. Always follow your healthcare professional's instructions.        Wearing Proper Shoes                    You walk on your feet every day, forcing them to support the weight of your body. Repeated stress on your feet can cause damage over time. The right shoes can help protect your feet. The wrong shoes can cause more foot problems. Read the information below to help you find a shoe that fits your foot needs.     A good shoe fit will cover your foot outline.       A shoe that doesnt cover the outline is a bad fit.      Whats Your Foot Shape?  To get a good fit, you need to know the shape of your foot. Do this simple test: While standing, place your foot on a piece of paper and trace around it. Is your foot straight or curved? Do you have a foot problem, such as a bunion, that causes your foot outline to show a bulge on the side of your big toe?  Finding Your Fit  Bring your foot outline to the [employer] store to help you find the right shoe. Place a shoe you like on top of the outline to see  if it matches the shape. The shoe should cover the outline. (If you have a bunion, the shoe may not cover the bulge on the outline. Look for soft leather shoes to stretch over the bunion.) Once youve found a pair of proper shoes, put them on. Walk around. Be sure the shoes dont rub or pinch. If the shoes feel good, youve found your fit!  The Right Shoe for You  A good shoe has features that provide comfort and support. It must also be the right size and shape for your feet. Look for a shoe made of breathable fabric and lining, such as leather or canvas. Be sure that shoes have enough tread to prevent slipping. Go to a good shoe store for help finding the right shoe.  Good Shoe Features  An ideal shoe has the following:  · Laces for support. If tying laces is a problem for you, try shoes with Velcro fasteners or flako.  · A front of the shoe (toe box) with ½ inch space in front of your longest toes.  · An arch shape that supports your foot.  · No more than 1½ inches of heel.  · A stiff, snug back of the shoe to keep your foot from sliding around.  · A smooth lining with no rough seams.  Shoe Shopping Tips  Below are some dos and donts for when you go to the shoe store.  Do:  · Select the shoes that feel right. Wear them around the house. Then bring them to your foot doctor to check for fit. If they dont fit well, return them.  · Shop late in the day, when your feet will be slightly bigger.  · Each time you buy shoes, have both your feet measured while you are standing. Foot size changes with time.  · Pick shoes to suit their purpose. High heels are okay for an occasional night on the town. But for everyday wear, choose a more sensible shoe.  · Try on shoes while wearing any inserts specially made for your feet (orthoses).  · Try on both the right and left shoes. If your feet are different sizes, pick a pair that fits the larger foot.  Dont:  · Dont buy shoes based on shoe size alone. Always try on shoes, as  sizes differ from brand to brand and within brands.  · Dont expect shoes to break in. If they dont fit at the store, dont buy them.  · Dont buy a shoe that doesnt match your foot shape.  What About Socks?  Always wear socks with shoes. Socks help absorb sweat and reduce friction and blistering. When shopping for shoes, choose soft, padded socks with seams that dont irritate your feet.  If You Have Foot Problems  Some foot problems cause deformities. This can make it hard to find a good fit. Look for shoes made of soft leather to stretch over the deformity. If you have bunions, buy shoes with a wider toe box. To fit hammertoes, look for shoes with a tall toe box. If you have arch problems, you may need inserts. In some cases, youll need to have custom footwear or orthoses made for your feet.  Suggested Footwear  Ask your healthcare provider what kind of footwear you need. He or she may recommend a certain brand or shoe store.  © 3819-7739 [employer]. [address] [place] [address]. All rights reserved. This information is not intended as a substitute for professional medical care. Always follow your healthcare professional's instructions.        Toe Extension (Flexibility)    These instructions are for your right foot. Switch sides for your left foot.  1. Sit in a chair. Rest your right ankle on your left knee.  2. Hold your toes with your right hand. Gently bend the toes backward. Feel a stretch in the undersides of the toes and ball of the foot. Hold for 30 to 60 seconds.  3. Then gently bend the toes in the other direction. Gently press on them until your foot is pointed. Hold for 30 to 60 seconds.  4. Repeat 5 times, or as instructed.  © 3500-0358 [employer]. [address] [place] [address]. All rights reserved. This information is not intended as a substitute for professional medical care. Always follow your healthcare professional's  instructions.             normal...

## 2021-03-29 ENCOUNTER — INPATIENT (INPATIENT)
Facility: HOSPITAL | Age: 71
LOS: 0 days | Discharge: ANOTHER HOSPITAL | DRG: 392 | End: 2021-03-30
Attending: INTERNAL MEDICINE | Admitting: INTERNAL MEDICINE
Payer: MEDICARE

## 2021-03-29 ENCOUNTER — EMERGENCY (EMERGENCY)
Facility: HOSPITAL | Age: 71
LOS: 1 days | Discharge: ANOTHER HOSPITAL | End: 2021-03-29
Attending: EMERGENCY MEDICINE
Payer: MEDICARE

## 2021-03-29 VITALS
SYSTOLIC BLOOD PRESSURE: 180 MMHG | DIASTOLIC BLOOD PRESSURE: 92 MMHG | RESPIRATION RATE: 15 BRPM | OXYGEN SATURATION: 96 % | HEIGHT: 71 IN | TEMPERATURE: 98 F | HEART RATE: 91 BPM | WEIGHT: 199.96 LBS

## 2021-03-29 VITALS
HEART RATE: 74 BPM | DIASTOLIC BLOOD PRESSURE: 78 MMHG | TEMPERATURE: 98 F | OXYGEN SATURATION: 99 % | SYSTOLIC BLOOD PRESSURE: 157 MMHG | RESPIRATION RATE: 18 BRPM

## 2021-03-29 VITALS
TEMPERATURE: 98 F | SYSTOLIC BLOOD PRESSURE: 204 MMHG | HEIGHT: 71 IN | WEIGHT: 199.96 LBS | DIASTOLIC BLOOD PRESSURE: 81 MMHG | HEART RATE: 86 BPM | OXYGEN SATURATION: 100 % | RESPIRATION RATE: 22 BRPM

## 2021-03-29 DIAGNOSIS — Z90.49 ACQUIRED ABSENCE OF OTHER SPECIFIED PARTS OF DIGESTIVE TRACT: Chronic | ICD-10-CM

## 2021-03-29 DIAGNOSIS — K59.00 CONSTIPATION, UNSPECIFIED: ICD-10-CM

## 2021-03-29 PROBLEM — Z00.00 ENCOUNTER FOR PREVENTIVE HEALTH EXAMINATION: Status: ACTIVE | Noted: 2021-03-29

## 2021-03-29 LAB
ALBUMIN SERPL ELPH-MCNC: 4.5 G/DL — SIGNIFICANT CHANGE UP (ref 3.3–5)
ALBUMIN SERPL ELPH-MCNC: 4.7 G/DL — SIGNIFICANT CHANGE UP (ref 3.3–5)
ALP SERPL-CCNC: 67 U/L — SIGNIFICANT CHANGE UP (ref 40–120)
ALP SERPL-CCNC: 71 U/L — SIGNIFICANT CHANGE UP (ref 40–120)
ALT FLD-CCNC: 22 U/L — SIGNIFICANT CHANGE UP (ref 10–45)
ALT FLD-CCNC: 23 U/L — SIGNIFICANT CHANGE UP (ref 10–45)
ANION GAP SERPL CALC-SCNC: 12 MMOL/L — SIGNIFICANT CHANGE UP (ref 5–17)
ANION GAP SERPL CALC-SCNC: 13 MMOL/L — SIGNIFICANT CHANGE UP (ref 5–17)
APPEARANCE UR: CLEAR — SIGNIFICANT CHANGE UP
APTT BLD: 35.3 SEC — SIGNIFICANT CHANGE UP (ref 27.5–35.5)
AST SERPL-CCNC: 22 U/L — SIGNIFICANT CHANGE UP (ref 10–40)
AST SERPL-CCNC: 24 U/L — SIGNIFICANT CHANGE UP (ref 10–40)
BACTERIA # UR AUTO: NEGATIVE — SIGNIFICANT CHANGE UP
BASOPHILS # BLD AUTO: 0.02 K/UL — SIGNIFICANT CHANGE UP (ref 0–0.2)
BASOPHILS # BLD AUTO: 0.03 K/UL — SIGNIFICANT CHANGE UP (ref 0–0.2)
BASOPHILS NFR BLD AUTO: 0.3 % — SIGNIFICANT CHANGE UP (ref 0–2)
BASOPHILS NFR BLD AUTO: 0.3 % — SIGNIFICANT CHANGE UP (ref 0–2)
BILIRUB SERPL-MCNC: 0.5 MG/DL — SIGNIFICANT CHANGE UP (ref 0.2–1.2)
BILIRUB SERPL-MCNC: 0.7 MG/DL — SIGNIFICANT CHANGE UP (ref 0.2–1.2)
BILIRUB UR-MCNC: NEGATIVE — SIGNIFICANT CHANGE UP
BUN SERPL-MCNC: 16 MG/DL — SIGNIFICANT CHANGE UP (ref 7–23)
BUN SERPL-MCNC: 22 MG/DL — SIGNIFICANT CHANGE UP (ref 7–23)
CALCIUM SERPL-MCNC: 8.8 MG/DL — SIGNIFICANT CHANGE UP (ref 8.4–10.5)
CALCIUM SERPL-MCNC: 9.2 MG/DL — SIGNIFICANT CHANGE UP (ref 8.4–10.5)
CHLORIDE SERPL-SCNC: 101 MMOL/L — SIGNIFICANT CHANGE UP (ref 96–108)
CHLORIDE SERPL-SCNC: 99 MMOL/L — SIGNIFICANT CHANGE UP (ref 96–108)
CO2 SERPL-SCNC: 22 MMOL/L — SIGNIFICANT CHANGE UP (ref 22–31)
CO2 SERPL-SCNC: 24 MMOL/L — SIGNIFICANT CHANGE UP (ref 22–31)
COLOR SPEC: SIGNIFICANT CHANGE UP
CREAT SERPL-MCNC: 0.85 MG/DL — SIGNIFICANT CHANGE UP (ref 0.5–1.3)
CREAT SERPL-MCNC: 0.86 MG/DL — SIGNIFICANT CHANGE UP (ref 0.5–1.3)
DIFF PNL FLD: NEGATIVE — SIGNIFICANT CHANGE UP
EOSINOPHIL # BLD AUTO: 0.04 K/UL — SIGNIFICANT CHANGE UP (ref 0–0.5)
EOSINOPHIL # BLD AUTO: 0.08 K/UL — SIGNIFICANT CHANGE UP (ref 0–0.5)
EOSINOPHIL NFR BLD AUTO: 0.4 % — SIGNIFICANT CHANGE UP (ref 0–6)
EOSINOPHIL NFR BLD AUTO: 1.2 % — SIGNIFICANT CHANGE UP (ref 0–6)
EPI CELLS # UR: 2 /HPF — SIGNIFICANT CHANGE UP
GAS PNL BLDV: SIGNIFICANT CHANGE UP
GLUCOSE SERPL-MCNC: 125 MG/DL — HIGH (ref 70–99)
GLUCOSE SERPL-MCNC: 148 MG/DL — HIGH (ref 70–99)
GLUCOSE UR QL: NEGATIVE — SIGNIFICANT CHANGE UP
HCT VFR BLD CALC: 38.9 % — LOW (ref 39–50)
HCT VFR BLD CALC: 40.5 % — SIGNIFICANT CHANGE UP (ref 39–50)
HGB BLD-MCNC: 12.9 G/DL — LOW (ref 13–17)
HGB BLD-MCNC: 13.5 G/DL — SIGNIFICANT CHANGE UP (ref 13–17)
IMM GRANULOCYTES NFR BLD AUTO: 0.3 % — SIGNIFICANT CHANGE UP (ref 0–1.5)
IMM GRANULOCYTES NFR BLD AUTO: 0.3 % — SIGNIFICANT CHANGE UP (ref 0–1.5)
INR BLD: 1.21 RATIO — HIGH (ref 0.88–1.16)
KETONES UR-MCNC: NEGATIVE — SIGNIFICANT CHANGE UP
LEUKOCYTE ESTERASE UR-ACNC: NEGATIVE — SIGNIFICANT CHANGE UP
LIDOCAIN IGE QN: 53 U/L — SIGNIFICANT CHANGE UP (ref 7–60)
LYMPHOCYTES # BLD AUTO: 1.35 K/UL — SIGNIFICANT CHANGE UP (ref 1–3.3)
LYMPHOCYTES # BLD AUTO: 1.54 K/UL — SIGNIFICANT CHANGE UP (ref 1–3.3)
LYMPHOCYTES # BLD AUTO: 14.1 % — SIGNIFICANT CHANGE UP (ref 13–44)
LYMPHOCYTES # BLD AUTO: 20.1 % — SIGNIFICANT CHANGE UP (ref 13–44)
MCHC RBC-ENTMCNC: 27.3 PG — SIGNIFICANT CHANGE UP (ref 27–34)
MCHC RBC-ENTMCNC: 27.7 PG — SIGNIFICANT CHANGE UP (ref 27–34)
MCHC RBC-ENTMCNC: 33.2 GM/DL — SIGNIFICANT CHANGE UP (ref 32–36)
MCHC RBC-ENTMCNC: 33.3 GM/DL — SIGNIFICANT CHANGE UP (ref 32–36)
MCV RBC AUTO: 82 FL — SIGNIFICANT CHANGE UP (ref 80–100)
MCV RBC AUTO: 83.7 FL — SIGNIFICANT CHANGE UP (ref 80–100)
MONOCYTES # BLD AUTO: 0.44 K/UL — SIGNIFICANT CHANGE UP (ref 0–0.9)
MONOCYTES # BLD AUTO: 0.79 K/UL — SIGNIFICANT CHANGE UP (ref 0–0.9)
MONOCYTES NFR BLD AUTO: 6.6 % — SIGNIFICANT CHANGE UP (ref 2–14)
MONOCYTES NFR BLD AUTO: 7.3 % — SIGNIFICANT CHANGE UP (ref 2–14)
NEUTROPHILS # BLD AUTO: 4.79 K/UL — SIGNIFICANT CHANGE UP (ref 1.8–7.4)
NEUTROPHILS # BLD AUTO: 8.46 K/UL — HIGH (ref 1.8–7.4)
NEUTROPHILS NFR BLD AUTO: 71.5 % — SIGNIFICANT CHANGE UP (ref 43–77)
NEUTROPHILS NFR BLD AUTO: 77.6 % — HIGH (ref 43–77)
NITRITE UR-MCNC: NEGATIVE — SIGNIFICANT CHANGE UP
NRBC # BLD: 0 /100 WBCS — SIGNIFICANT CHANGE UP (ref 0–0)
NRBC # BLD: 0 /100 WBCS — SIGNIFICANT CHANGE UP (ref 0–0)
PH UR: 6.5 — SIGNIFICANT CHANGE UP (ref 5–8)
PLATELET # BLD AUTO: 178 K/UL — SIGNIFICANT CHANGE UP (ref 150–400)
PLATELET # BLD AUTO: 226 K/UL — SIGNIFICANT CHANGE UP (ref 150–400)
POTASSIUM SERPL-MCNC: 4 MMOL/L — SIGNIFICANT CHANGE UP (ref 3.5–5.3)
POTASSIUM SERPL-MCNC: 4.4 MMOL/L — SIGNIFICANT CHANGE UP (ref 3.5–5.3)
POTASSIUM SERPL-SCNC: 4 MMOL/L — SIGNIFICANT CHANGE UP (ref 3.5–5.3)
POTASSIUM SERPL-SCNC: 4.4 MMOL/L — SIGNIFICANT CHANGE UP (ref 3.5–5.3)
PROT SERPL-MCNC: 7.2 G/DL — SIGNIFICANT CHANGE UP (ref 6–8.3)
PROT SERPL-MCNC: 7.5 G/DL — SIGNIFICANT CHANGE UP (ref 6–8.3)
PROT UR-MCNC: NEGATIVE — SIGNIFICANT CHANGE UP
PROTHROM AB SERPL-ACNC: 14.4 SEC — HIGH (ref 10.6–13.6)
RBC # BLD: 4.65 M/UL — SIGNIFICANT CHANGE UP (ref 4.2–5.8)
RBC # BLD: 4.94 M/UL — SIGNIFICANT CHANGE UP (ref 4.2–5.8)
RBC # FLD: 14.5 % — SIGNIFICANT CHANGE UP (ref 10.3–14.5)
RBC # FLD: 14.5 % — SIGNIFICANT CHANGE UP (ref 10.3–14.5)
RBC CASTS # UR COMP ASSIST: 2 /HPF — SIGNIFICANT CHANGE UP (ref 0–4)
SARS-COV-2 RNA SPEC QL NAA+PROBE: SIGNIFICANT CHANGE UP
SODIUM SERPL-SCNC: 134 MMOL/L — LOW (ref 135–145)
SODIUM SERPL-SCNC: 137 MMOL/L — SIGNIFICANT CHANGE UP (ref 135–145)
SP GR SPEC: 1.02 — SIGNIFICANT CHANGE UP (ref 1.01–1.02)
UROBILINOGEN FLD QL: NEGATIVE — SIGNIFICANT CHANGE UP
WBC # BLD: 10.89 K/UL — HIGH (ref 3.8–10.5)
WBC # BLD: 6.7 K/UL — SIGNIFICANT CHANGE UP (ref 3.8–10.5)
WBC # FLD AUTO: 10.89 K/UL — HIGH (ref 3.8–10.5)
WBC # FLD AUTO: 6.7 K/UL — SIGNIFICANT CHANGE UP (ref 3.8–10.5)
WBC UR QL: 1 /HPF — SIGNIFICANT CHANGE UP (ref 0–5)

## 2021-03-29 PROCEDURE — 80053 COMPREHEN METABOLIC PANEL: CPT

## 2021-03-29 PROCEDURE — 74177 CT ABD & PELVIS W/CONTRAST: CPT | Mod: 26,MA

## 2021-03-29 PROCEDURE — 85025 COMPLETE CBC W/AUTO DIFF WBC: CPT

## 2021-03-29 PROCEDURE — 85018 HEMOGLOBIN: CPT

## 2021-03-29 PROCEDURE — 84295 ASSAY OF SERUM SODIUM: CPT

## 2021-03-29 PROCEDURE — 82803 BLOOD GASES ANY COMBINATION: CPT

## 2021-03-29 PROCEDURE — 99285 EMERGENCY DEPT VISIT HI MDM: CPT | Mod: 25

## 2021-03-29 PROCEDURE — 36415 COLL VENOUS BLD VENIPUNCTURE: CPT

## 2021-03-29 PROCEDURE — 81001 URINALYSIS AUTO W/SCOPE: CPT

## 2021-03-29 PROCEDURE — 82435 ASSAY OF BLOOD CHLORIDE: CPT

## 2021-03-29 PROCEDURE — 84132 ASSAY OF SERUM POTASSIUM: CPT

## 2021-03-29 PROCEDURE — 85730 THROMBOPLASTIN TIME PARTIAL: CPT

## 2021-03-29 PROCEDURE — 74177 CT ABD & PELVIS W/CONTRAST: CPT

## 2021-03-29 PROCEDURE — 83605 ASSAY OF LACTIC ACID: CPT

## 2021-03-29 PROCEDURE — 85014 HEMATOCRIT: CPT

## 2021-03-29 PROCEDURE — 51702 INSERT TEMP BLADDER CATH: CPT

## 2021-03-29 PROCEDURE — 87635 SARS-COV-2 COVID-19 AMP PRB: CPT

## 2021-03-29 PROCEDURE — 83690 ASSAY OF LIPASE: CPT

## 2021-03-29 PROCEDURE — 99285 EMERGENCY DEPT VISIT HI MDM: CPT | Mod: CS,GC

## 2021-03-29 PROCEDURE — 87086 URINE CULTURE/COLONY COUNT: CPT

## 2021-03-29 PROCEDURE — 82330 ASSAY OF CALCIUM: CPT

## 2021-03-29 PROCEDURE — 85610 PROTHROMBIN TIME: CPT

## 2021-03-29 PROCEDURE — 82947 ASSAY GLUCOSE BLOOD QUANT: CPT

## 2021-03-29 PROCEDURE — 99284 EMERGENCY DEPT VISIT MOD MDM: CPT | Mod: GC

## 2021-03-29 PROCEDURE — 99283 EMERGENCY DEPT VISIT LOW MDM: CPT | Mod: 25

## 2021-03-29 RX ORDER — SODIUM CHLORIDE 9 MG/ML
1000 INJECTION INTRAMUSCULAR; INTRAVENOUS; SUBCUTANEOUS ONCE
Refills: 0 | Status: COMPLETED | OUTPATIENT
Start: 2021-03-29 | End: 2021-03-29

## 2021-03-29 RX ADMIN — SODIUM CHLORIDE 1000 MILLILITER(S): 9 INJECTION INTRAMUSCULAR; INTRAVENOUS; SUBCUTANEOUS at 19:56

## 2021-03-29 NOTE — ED ADULT TRIAGE NOTE - CHIEF COMPLAINT QUOTE
pt was seen this am for unable to have bm all day had a urinary cath placed with leg bag and d/c home  pt tried Miralax mag citrate pt started chemo Friday

## 2021-03-29 NOTE — ED PROVIDER NOTE - CLINICAL SUMMARY MEDICAL DECISION MAKING FREE TEXT BOX
70 year old M with pmhx of colon CA s/p colon section on 2/17 and starting chemo on 3/26 presents to ED c/o constipation, not passing gas, bloated x 2 days. Pt reports he was seen at [facility] ED earlier this morning for same, had cath placed for urinary retention, and DC'd home. Pt reports that he continues to have constipation and has been taking Miralax at home with no relief. Had small BM yesterday but still feels backed up.   vitals wnl, on exam patient has distended abd with generalized ttp. will eval with labs and ct, likely admit for further workup.

## 2021-03-29 NOTE — ED PROVIDER NOTE - OBJECTIVE STATEMENT
70 year old M with pmhx of colon CA s/p colon section on 2/17 and starting chemo on 3/26 presents to ED c/o constipation, not passing gas, bloated x 2 days. Pt reports he was seen at [facility] ED earlier this morning for same, had cath placed for urinary retention, and DC'd home. Pt reports that he continues to have constipation and has been taking Miralax at home with no relief. Had small BM yesterday but still feels backed up.   pt denies any fever, chills, cp, palpitations, sob, n/v/d, weight loss

## 2021-03-29 NOTE — ED ADULT NURSE NOTE - NSIMPLEMENTINTERV_GEN_ALL_ED
Implemented All Universal Safety Interventions:  [place] to call system. Call bell, personal items and telephone within reach. Instruct patient to call for assistance. Room bathroom lighting operational. Non-slip footwear when patient is off stretcher. Physically safe environment: no spills, clutter or unnecessary equipment. Stretcher in lowest position, wheels locked, appropriate side rails in place.

## 2021-03-29 NOTE — ED PROVIDER NOTE - CLINICAL SUMMARY MEDICAL DECISION MAKING FREE TEXT BOX
PGY3/MD Stefani. 69 yo M, leukemia, on chemo p/w urinary retension. no fever or chills, severy in discomfort and supra pubic distension, will get Folley in. Attending MD Martinez:  70M with ho colon cancer, hairy cell leukemia presenting with constipation and acute urinary retention. Kohler placed with over 700cc of urine return with relief of abdominal discomfort. Benign abdominal exam. associated constipation which could be contributing to urinary retention, ?BPH history as well. Will check screening labs, UA, kohler to remain in and trial of void as outpatient with urology and/or PMD.      *The above represents an initial assessment/impression. Please refer to progress notes for potential changes in patient clinical course*

## 2021-03-29 NOTE — ED PROVIDER NOTE - NSFOLLOWUPCLINICS_GEN_ALL_ED_FT
[place] [facility]  Urology  [address], [address]  [place] [address]  Phone: [phone]  Fax:   Follow Up Time: 4-6 Days    [facility] - Urology  Urology  [address] [place] [address], 3rd & 4th [place] [address]  Phone: [phone]  Fax:   Follow Up Time: 4-6 Days

## 2021-03-29 NOTE — ED PROVIDER NOTE - PHYSICAL EXAMINATION
Vital signs reviewed  GENERAL: Patient nontoxic appearing, NAD  HEAD: NCAT  EYES: Anicteric  ENT: MMM  NECK: Supple, non tender  RESPIRATORY: Normal respiratory effort. CTA B/L. No wheezing, rales, rhonchi  CARDIOVASCULAR: Regular rate and rhythm  ABDOMEN: Soft. +distension generalized abd tenderness   MUSCULOSKELETAL/EXTREMITIES: Brisk cap refill. 2+ radial pulses. No leg edema.  SKIN:  Warm and dry  NEURO: AAOx3. No gross FND.  PSYCHIATRIC: Cooperative. Affect appropriate.

## 2021-03-29 NOTE — ED PROVIDER NOTE - RAPID ASSESSMENT
70 year old M with pmhx of colon CA s/p colon section on 2/17 and starting chemo on 3/26 presents to ED c/o constipation. Pt reports he was seen at [facility] ED earlier this morning for same, had cath placed for urinary retention, and DC'd home. Pt reports that he continues to have constipation and has been taking Miralax at home with no relief. Had small BM yesterday but still feels backed up.     Patient was seen as a tele QDOC patient. The patient will be seen and further worked up in the main emergency department and their care will be completed by the main emergency department team along with a thorough physical exam. Receiving team will follow up on labs, analgesia, any clinical imaging, reassess and disposition as clinically indicated, all decisions regarding the progression of care will be made at their discretion.    Scribe Statement: JHONNY, Sung Barreto, attest that this documentation has been prepared under the direction and in the presence of Tanna Segovia)

## 2021-03-29 NOTE — CONSULT NOTE ADULT - SUBJECTIVE AND OBJECTIVE BOX
Brief heme/onc note    [place] [facility] Zoe Goldberg, with colon ca, s/p first cycle of chemo on 3/26. Seen this am for urinary retention, returned for similar sx.     ED eval pending. If pt needs admission and stable for gen med level of care, pls admit to Dr Davenport.     Pls call with questions, will follow, [phone]

## 2021-03-29 NOTE — ED PROVIDER NOTE - OBJECTIVE STATEMENT
PGY3/MD Stefani. 69 yo M, with hx hairy cell leukemia (dx Sept '17) on chemo at [facility], pneumonia (Feb '18), B12 deficiency (dx'd "yrs ago"), HLD, nephrolithiasis, BPH?, p/w urinary retention. Pt states he had the same episode after chemo, this time received chemo 2d ago, started trouble urination since this morning, never had Folley catheter before. Does not recall the name of chemo, denies fever or chills. Otherwise, no other symptoms, denies chest pain, sob.

## 2021-03-29 NOTE — ED PROVIDER NOTE - PHYSICAL EXAMINATION
PGY3/MD Stefani.   VITALS: reviewed  GEN: ++ sever distressed, A & O x 4  HEAD/EYES: NC/AT, anicteric sclerae, no conjunctival pallor  ENT: mucus membranes moist, oropharynx WNL, trachea midline, no JVD, neck is supple  RESP: lungs CTA with equal breath sounds bilaterally, chest wall nontender and atraumatic  CV: heart with reg rhythm S1, S2, no murmur; distal pulses intact and symmetric bilaterally  ABDOMEN: normoactive bowel sounds, soft, nondistended, + supra pubic discomfort  MSK: extremities atraumatic and nontender, no edema, no asymmetry.  SKIN: warm, dry, no rash, no bruising, no cyanosis. color appropriate for ethnicity  NEURO: alert, mentating appropriately, no facial asymmetry.   PSYCH: Affect appropriate

## 2021-03-29 NOTE — ED PROVIDER NOTE - PATIENT PORTAL LINK FT
You can access the FollowMyHealth Patient Portal offered by [facility] by registering at the following website: [email or web address]. By joining [employer]’s FollowMyHealth portal, you will also be able to view your health information using other applications (apps) compatible with our system.

## 2021-03-29 NOTE — H&P ADULT - HISTORY OF PRESENT ILLNESS
admit for constipation  urinary retenion  on behalf of Dr. colvin for tonight only    full h&P to follow Admit cancelled by ED. patient has been arranged to transfer to [facility] for further management from ED

## 2021-03-29 NOTE — ED ADULT NURSE NOTE - CAS EDN DISCHARGE ASSESSMENT
Alert and oriented to person, place and time/Patient baseline mental status Alert and oriented to person, place and time

## 2021-03-29 NOTE — ED PROVIDER NOTE - NS ED ROS FT
Constitutional: No fever, chills.  Eyes:  No visual changes  ENMT:  No neck pain  Cardiac:  No chest pain  Respiratory:  No cough, SOB  GI:  No nausea, vomiting, diarrhea, +abdominal pain.  :  No dysuria, hematuria  MS:  No back pain.  Neuro:  No headache or lightheadedness  Skin:  No skin rash  Endocrine: No history of thyroid disease or diabetes.  Except as documented in the HPI,  all other systems are negative.

## 2021-03-29 NOTE — ED ADULT NURSE REASSESSMENT NOTE - NS ED NURSE REASSESS COMMENT FT1
Hernandez catheter inserted using sterile technique. Second RN present to confirm sterility. Bedside drainage to gravity. Stat lock in place.

## 2021-03-29 NOTE — ED PROVIDER NOTE - PROGRESS NOTE DETAILS
PGY3/MD Stefani. Folley was placed, more than 650 ml, clear urine. PGY3/MD Stefani. Hernandez was placed, more than 650 ml, clear urine. PGY3/MD Stefani. stating constipation for 2-3 days, may be an inducing factor for retension, bowel resume, general instruction for constipation has been given. Urology follow up, I have given the pt strict return and follow up precautions. The patient has been provided with a copy of all pertinent results. The patient has been informed of all concerning signs and symptoms to return to Emergency Department, the necessity to follow up with PMD/Clinic/follow up provided within 2-3 days was explained, and the patient reports understanding of above with capacity and insight.

## 2021-03-29 NOTE — ED PROVIDER NOTE - PROGRESS NOTE DETAILS
Henry Moeller PGY-1 patient was admitted under Dr. Davenport service however would like to be transferred to [facility]. Spoke with fellow in [facility], accepts patient. pt will be undergoing ED to ED transfer. stable for transfer.

## 2021-03-29 NOTE — ED ADULT NURSE NOTE - OBJECTIVE STATEMENT
70y M, A&Ox4, ambulatory, [facility] colon CA s/p surgery on 2/17 chemo on 3/26 presents to the ED c/o constipation since sunday morning and feeling full, not passing gas. Pt was seen at [facility] ED, d/c at 6am this morning for the same, had kohler cath placed for urinary retention, and DC'd home. Pt reports that he continues to have constipation and has been taking Miralax at home with no relief. Had small BM yesterday but still feels backed up. Gross neuro intact, no difficulty speaking in complete sentences, pulses x 4, moving all extremities, abdomen mildly distended, nontender, skin intact. Pt denies fevers/chills, numbness/tingling, weakness, headache, dizziness, vision changes, cp, sob, cough, n/v/d, dysuria, hematuria, bloody stools, back pain.

## 2021-03-29 NOTE — ED ADULT NURSE NOTE - OBJECTIVE STATEMENT
70 y.o M presents to the ED from home c/o urinary retention. Patient is awake, alert and speaking coherently. As per patient he started chemotherapy on 3/26 for colon cancer and since then, has not been able to urinate normally. Patient reports he did not urinate for approximately 10 hours. Patient presents A&Ox3, afebrile and ambulatory; abdomen distended and tender, denies fever/chills, denies chest pain, SOB, n/v/d.

## 2021-03-29 NOTE — ED PROVIDER NOTE - NSFOLLOWUPINSTRUCTIONS_ED_ALL_ED_FT
(1) Follow up with your primary care physician as discussed. In addition, we did not find evidence of a life threatening illness on your testing here today, but listed below are the specialists that will be necessary to see as an outpatient to continue the workup.  Please call the numbers listed below or [phone] to set up the necessary appointments  or call [phone] to make an appointment with the clinic.  (2) You were seen for Urinary Retension. A copy of your resulted labs, imaging, and findings have been provided to you.  (3) "Specific instruction".   (4) Return immediately to the emergency department for new, persistent, or worsening symptoms or signs. Return immediately to the emergency department if you have chest pain, shortness of breath, loss of consciousness, or any other new concerns.    - Please establish appointment with Urologist and see the doctor within 5-7 days. (1) Follow up with your primary care physician as discussed. In addition, we did not find evidence of a life threatening illness on your testing here today, but listed below are the specialists that will be necessary to see as an outpatient to continue the workup.  Please call the numbers listed below or [phone] to set up the necessary appointments  or call [phone] to make an appointment with the clinic.  (2) You were seen for Urinary Retension. A copy of your resulted labs, imaging, and findings have been provided to you.  (3) "Specific instruction".   (4) Return immediately to the emergency department for new, persistent, or worsening symptoms or signs. Return immediately to the emergency department if you have chest pain, shortness of breath, loss of consciousness, or any other new concerns.    - Please establish appointment with Urologist and see the doctor within 5-7 days.    You had a thorough evaluation including an exam, labs and imaging.  1. You were seen for Constipation. A copy of your resulted labs, imaging, and findings have been provided to you.  2. Read Follow-up Instructions that you have given.  3. Take Tylenol 500 mg (1 or 2 every 6 hours) and/or naproxen 500 mg (1 every 12 hours) for pain.   4. Treatment varies but may include dietary modifications (more fiber-rich foods such as fruits, vegetables, and whole grains), lifestyle modifications (regular exercise, water intake), and possible medications. Add a fiber supplement like Psyllium (aka Metamucil, Konsyl) three times per day. Can double the amount taken three times a day, if needed. You can try a stimulant like Senna (aka Senexon, Senekot) as needed if all the above haven't worked but don't take the stimulant regularly.  5. You should also establish care with Gastroenterology by calling  [phone] to find a doctor affiliated with [facility] in your neighborhood & network. You have given the information of affiliate doctors. Return if symptoms worse, particularly if vomiting or severe pain. Follow up with your primary care doctor within 48 hours. Please call [phone] to make an appointment or with any questions you may have.  or call [phone] to make an appointment with the clinic.  6. Return immediately to the emergency department for new, persistent, or worsening symptoms or signs. Return immediately to the emergency department if you have chest pain, shortness of breath, loss of consciousness, or any other new concerns.

## 2021-03-29 NOTE — ED PROVIDER NOTE - ATTENDING CONTRIBUTION TO CARE
Private Physician HEME/Onc [facility]   70y male pm[facility] Colon Ca sp Resection, 2/17 [facility], ON chemo. Now comes to ed c/o constipation and abd distension. No fever chills,sob,cp,cough, PE WDWN male ncat neck supple chest clear ap cv no rgm neuro no focal defects. Abd mild distended, no rebound guarding. Neuro no focal defects  Steve Suárez MD, Facep

## 2021-03-30 VITALS
OXYGEN SATURATION: 98 % | HEART RATE: 98 BPM | RESPIRATION RATE: 20 BRPM | TEMPERATURE: 98 F | SYSTOLIC BLOOD PRESSURE: 152 MMHG | DIASTOLIC BLOOD PRESSURE: 86 MMHG

## 2021-03-30 LAB
CULTURE RESULTS: NO GROWTH — SIGNIFICANT CHANGE UP
SPECIMEN SOURCE: SIGNIFICANT CHANGE UP

## 2021-03-30 NOTE — ED ADULT NURSE REASSESSMENT NOTE - NS ED NURSE REASSESS COMMENT FT1
pt offered pain medication for catheter discomfort by RN and by MD- but pt declined. pt educated on wait time of ambulance service.

## 2021-03-31 ENCOUNTER — APPOINTMENT (OUTPATIENT)
Dept: UROLOGY | Facility: CLINIC | Age: 71
End: 2021-03-31

## 2021-04-16 NOTE — ED ADULT NURSE NOTE - NS ED NURSE LEVEL OF CONSCIOUSNESS AFFECT
Problem: Falls - Risk of:  Goal: Will remain free from falls  Description: Will remain free from falls  4/16/2021 0558 by Carina Nuno RN  Outcome: Met This Shift  4/15/2021 1830 by Ana Rosa Villegas RN  Outcome: Met This Shift  Goal: Absence of physical injury  Description: Absence of physical injury  4/16/2021 0558 by Carina Nuno RN  Outcome: Met This Shift  4/15/2021 1830 by Ana Roas Villegas RN  Outcome: Met This Shift     Problem: Pain:  Goal: Pain level will decrease  Description: Pain level will decrease  4/16/2021 0558 by Carina Nuno RN  Outcome: Met This Shift  4/15/2021 1830 by Ana Rosa Villegas RN  Outcome: Met This Shift  Goal: Control of acute pain  Description: Control of acute pain  4/16/2021 0558 by Carina Nuno RN  Outcome: Met This Shift  4/15/2021 1830 by Ana Rosa Villegas RN  Outcome: Met This Shift  Goal: Control of chronic pain  Description: Control of chronic pain  4/16/2021 0558 by Carina Nuno RN  Outcome: Met This Shift  4/15/2021 1830 by Ana Rosa Villegas RN  Outcome: Met This Shift Calm

## 2021-10-25 NOTE — ED ADULT NURSE NOTE - SUICIDE SCREENING DEPRESSION
Partially impaired: cannot see medication labels or newsprint, but can see obstacles in path, and the surrounding layout; can count fingers at arm's length Negative

## 2023-05-08 NOTE — ED ADULT NURSE NOTE - CAS EDN DISCHARGE INTERVENTIONS
Thank you for that information! I sent a note to the billing office to rectify that pt should NOT be billed for that service with you  Please advise is pt is aware that she will have $725 charge for the injection with Dr Turner Mcmillan  Thank you! no ivl

## 2023-12-05 NOTE — ED PROVIDER NOTE - NS_ ATTENDINGSCRIBEDETAILS _ED_A_ED_FT
Steve Spencer MD note: The scribe's documentation has been prepared under my direction and personally reviewed by me.  I confirm that the note above accurately reflects my work, treatment, procedures, and medical decision making.
N/A

## 2025-06-20 ENCOUNTER — APPOINTMENT (OUTPATIENT)
Dept: ELECTROPHYSIOLOGY | Facility: CLINIC | Age: 75
End: 2025-06-20

## 2025-06-20 VITALS
DIASTOLIC BLOOD PRESSURE: 68 MMHG | HEART RATE: 91 BPM | SYSTOLIC BLOOD PRESSURE: 142 MMHG | WEIGHT: 200 LBS | OXYGEN SATURATION: 97 %

## 2025-06-20 PROCEDURE — 99203 OFFICE O/P NEW LOW 30 MIN: CPT | Mod: 25

## 2025-06-20 PROCEDURE — 93000 ELECTROCARDIOGRAM COMPLETE: CPT

## 2025-06-20 RX ORDER — MULTIVIT-MIN/IRON/FOLIC ACID/K 18-600-40
50 MCG CAPSULE ORAL
Refills: 0 | Status: ACTIVE | COMMUNITY
Start: 2025-06-20

## 2025-06-20 RX ORDER — DIGOXIN 125 UG/1
125 TABLET ORAL DAILY
Qty: 90 | Refills: 0 | Status: ACTIVE | COMMUNITY
Start: 2025-06-20

## 2025-06-20 RX ORDER — CYANOCOBALAMIN (VITAMIN B-12) 1000 MCG
1000 TABLET ORAL DAILY
Refills: 0 | Status: ACTIVE | COMMUNITY
Start: 2025-06-20

## 2025-06-20 RX ORDER — AMIODARONE HYDROCHLORIDE 200 MG/1
200 TABLET ORAL DAILY
Qty: 14 | Refills: 0 | Status: ACTIVE | COMMUNITY
Start: 2025-06-20

## 2025-06-20 RX ORDER — EZETIMIBE 10 MG/1
10 TABLET ORAL
Qty: 90 | Refills: 3 | Status: ACTIVE | COMMUNITY
Start: 2025-06-20

## 2025-06-20 RX ORDER — RIVAROXABAN 20 MG/1
20 TABLET, FILM COATED ORAL
Qty: 30 | Refills: 0 | Status: ACTIVE | COMMUNITY
Start: 2025-06-20

## 2025-06-20 RX ORDER — PRASUGREL 5 MG/1
5 TABLET, FILM COATED ORAL DAILY
Refills: 0 | Status: ACTIVE | COMMUNITY
Start: 2025-06-20

## 2025-06-20 RX ORDER — ROSUVASTATIN CALCIUM 20 MG/1
20 TABLET, FILM COATED ORAL
Qty: 30 | Refills: 0 | Status: ACTIVE | COMMUNITY
Start: 2025-06-20

## 2025-06-20 RX ORDER — METOPROLOL SUCCINATE 50 MG/1
50 TABLET, EXTENDED RELEASE ORAL
Qty: 90 | Refills: 3 | Status: ACTIVE | COMMUNITY
Start: 2025-06-20